# Patient Record
Sex: FEMALE | Race: ASIAN | NOT HISPANIC OR LATINO | URBAN - METROPOLITAN AREA
[De-identification: names, ages, dates, MRNs, and addresses within clinical notes are randomized per-mention and may not be internally consistent; named-entity substitution may affect disease eponyms.]

---

## 2017-02-14 VITALS
OXYGEN SATURATION: 98 % | RESPIRATION RATE: 16 BRPM | WEIGHT: 160.94 LBS | TEMPERATURE: 98 F | SYSTOLIC BLOOD PRESSURE: 133 MMHG | DIASTOLIC BLOOD PRESSURE: 65 MMHG

## 2017-02-14 RX ORDER — DIPHENHYDRAMINE HCL 50 MG
50 CAPSULE ORAL ONCE
Qty: 0 | Refills: 0 | Status: DISCONTINUED | OUTPATIENT
Start: 2017-02-16 | End: 2017-02-16

## 2017-02-14 RX ORDER — CHLORHEXIDINE GLUCONATE 213 G/1000ML
1 SOLUTION TOPICAL ONCE
Qty: 0 | Refills: 0 | Status: DISCONTINUED | OUTPATIENT
Start: 2017-02-16 | End: 2017-02-16

## 2017-02-14 NOTE — H&P ADULT. - HISTORY OF PRESENT ILLNESS
SKELETON    67 yo female PMHx HTN, hyperlipidemia presents to cardiologist endorsing chest pain and SOB. NST on 1/28/17 showed reversible perfusion defect in apical and lateral walls. Resting EKG showed nonspecific T wave changes. EF 69%.     In light of pt's risk factors, CCS Class .... anginal symptoms, and abnormal NST pt presents for recommended left cardiac catheterization with possible intervention. Severe shellfish allergy - premedicate with Prednisone 60mg Q8H on Wednesday and Thursday AM (4 doses total). Eprescribed to [facility] in [facility]. Pt works up there and will  on 2/15/17 AM. Dr. Burden aware.     Pt's phone # in [place] [phone]. Pt [place] commute to the city on Thurs to have procedure.    Pt will bring in all medications to hospital    65 yo female SHELLFISH ALLERGY (ANAPHYLAXIS), severe vomiting with Aspirin products, strong FHx MI (Brothers x 2 MI in 40s, Father MI), PMHx HTN, and "stomach ulcer" (pt has stomach pain with spicy food but has never had and EGD or been treated for an ulcer) presents to cardiologist endorsing chest pain and shortness of breath and fatigue when climbing 2 flights of stairs, worsening over the last 2 years. Denies dizziness, palpitations, PND/orthopnea, LE edema, claudication. NST on 1/28/17 showed reversible perfusion defect in apical and lateral walls. Resting EKG showed nonspecific T wave changes. EF 69%.     In light of pt's risk factors, CCS Class 3 anginal symptoms, and abnormal NST pt presents for recommended left cardiac catheterization with possible intervention. 65 yo female SHELLFISH ALLERGY (ANAPHYLAXIS), severe vomiting with Aspirin products, strong FHx MI (Brothers x 2 MI in 40s, Father MI), PMHx HTN, and "stomach ulcer" (pt has stomach pain with spicy food but has never had and EGD or been treated for an ulcer) presents to cardiologist endorsing chest pain and shortness of breath and fatigue when climbing 2 flights of stairs, worsening over the last 2 years. Denies dizziness, palpitations, PND/orthopnea, LE edema, claudication. Nuclear Stress Test on 1/28/17 showed reversible perfusion defect in apical and lateral walls. Resting EKG showed nonspecific T wave changes. EF 69%.     In light of pt's risk factors, CCS Class 3 anginal symptoms, and abnormal NST pt presents for recommended left cardiac catheterization with possible intervention.       **Of note: Severe shellfish allergy - premedicated with Prednisone 60mg Q8H on Wednesday and Thursday AM (4 doses total). Dr. Bertram george.

## 2017-02-14 NOTE — H&P ADULT. - FAMILY HISTORY
Sibling  Still living? No  Family history of myocardial infarction at age less than 60, Age at diagnosis: Age Unknown     Father  Still living? Unknown  Family history of acute myocardial infarction, Age at diagnosis: Age Unknown

## 2017-02-14 NOTE — H&P ADULT. - ASSESSMENT
66 yr old F with strong FHx of heart disease, PMHx of HTN with CCS Angina Class III equivalent symptoms and abnormal NST, patient now presents for recommended cardiac catheterization with possible intervention.     ASA II            Mallampati: II    Risks & benefits of procedure and alternative therapy have been explained to the patient including but not limited to: allergic reaction, bleeding w/possible need for blood transfusion, infection, renal and vascular compromise, limb damage, arrhythmia, stroke, vessel dissection/perforation, Myocardial infarction, emergent CABG. Informed consent obtained and in chart.

## 2017-02-14 NOTE — H&P ADULT. - PROBLEM SELECTOR PLAN 1
NPO, precath/consented, premedicated for shellfish allergy with Prednisone PO and will give Benadryl 50mg IV x 1 dose on call to cath lab NPO, precath/consented, premedicated for shellfish allergy with Prednisone PO and will give Benadryl 50mg IV x 1 dose on call to cath lab. Will hold off ASA load and give Plavix 600mg PO x 1 dose as discussed with Dr. Burden

## 2017-02-16 ENCOUNTER — OUTPATIENT (OUTPATIENT)
Dept: OUTPATIENT SERVICES | Facility: HOSPITAL | Age: 67
LOS: 1 days | Discharge: NURSING, REHAB OR LONG-TERM CARE | End: 2017-02-16
Payer: MEDICARE

## 2017-02-16 DIAGNOSIS — I20.9 ANGINA PECTORIS, UNSPECIFIED: ICD-10-CM

## 2017-02-16 DIAGNOSIS — E78.5 HYPERLIPIDEMIA, UNSPECIFIED: ICD-10-CM

## 2017-02-16 DIAGNOSIS — Z96.653 PRESENCE OF ARTIFICIAL KNEE JOINT, BILATERAL: Chronic | ICD-10-CM

## 2017-02-16 DIAGNOSIS — I10 ESSENTIAL (PRIMARY) HYPERTENSION: ICD-10-CM

## 2017-02-16 DIAGNOSIS — I25.110 ATHEROSCLEROTIC HEART DISEASE OF NATIVE CORONARY ARTERY WITH UNSTABLE ANGINA PECTORIS: ICD-10-CM

## 2017-02-16 DIAGNOSIS — Z98.49 CATARACT EXTRACTION STATUS, UNSPECIFIED EYE: Chronic | ICD-10-CM

## 2017-02-16 DIAGNOSIS — R94.39 ABNORMAL RESULT OF OTHER CARDIOVASCULAR FUNCTION STUDY: ICD-10-CM

## 2017-02-16 LAB
ALBUMIN SERPL ELPH-MCNC: 4 G/DL — SIGNIFICANT CHANGE UP (ref 3.4–5)
ALP SERPL-CCNC: 68 U/L — SIGNIFICANT CHANGE UP (ref 40–120)
ALT FLD-CCNC: 24 U/L — SIGNIFICANT CHANGE UP (ref 12–42)
ANION GAP SERPL CALC-SCNC: 10 MMOL/L — SIGNIFICANT CHANGE UP (ref 9–16)
APTT BLD: 37.9 SEC — HIGH (ref 27.5–37.4)
AST SERPL-CCNC: 14 U/L — LOW (ref 15–37)
BASOPHILS NFR BLD AUTO: 0 % — SIGNIFICANT CHANGE UP (ref 0–2)
BILIRUB SERPL-MCNC: 0.9 MG/DL — SIGNIFICANT CHANGE UP (ref 0.2–1.2)
BUN SERPL-MCNC: 19 MG/DL — SIGNIFICANT CHANGE UP (ref 7–23)
CALCIUM SERPL-MCNC: 9.1 MG/DL — SIGNIFICANT CHANGE UP (ref 8.5–10.5)
CHLORIDE SERPL-SCNC: 107 MMOL/L — SIGNIFICANT CHANGE UP (ref 96–108)
CHOLEST SERPL-MCNC: 217 MG/DL — HIGH
CK MB CFR SERPL CALC: 2.3 NG/ML — SIGNIFICANT CHANGE UP (ref 0.5–3.6)
CO2 SERPL-SCNC: 24 MMOL/L — SIGNIFICANT CHANGE UP (ref 22–31)
CREAT SERPL-MCNC: 0.62 MG/DL — SIGNIFICANT CHANGE UP (ref 0.5–1.3)
EOSINOPHIL NFR BLD AUTO: 0 % — SIGNIFICANT CHANGE UP (ref 0–6)
GLUCOSE SERPL-MCNC: 135 MG/DL — HIGH (ref 70–99)
HCT VFR BLD CALC: 36.8 % — SIGNIFICANT CHANGE UP (ref 34.5–45)
HDLC SERPL-MCNC: 85 MG/DL — SIGNIFICANT CHANGE UP
HGB BLD-MCNC: 12.1 G/DL — SIGNIFICANT CHANGE UP (ref 11.5–15.5)
INR BLD: 1 — SIGNIFICANT CHANGE UP (ref 0.88–1.16)
LIPID PNL WITH DIRECT LDL SERPL: 121 MG/DL — HIGH
LYMPHOCYTES # BLD AUTO: 7.8 % — LOW (ref 13–44)
MCHC RBC-ENTMCNC: 21.5 PG — LOW (ref 27–34)
MCHC RBC-ENTMCNC: 32.9 G/DL — SIGNIFICANT CHANGE UP (ref 32–36)
MCV RBC AUTO: 65.2 FL — LOW (ref 80–100)
MONOCYTES NFR BLD AUTO: 1.4 % — LOW (ref 2–14)
NEUTROPHILS NFR BLD AUTO: 90.8 % — HIGH (ref 43–77)
PLATELET # BLD AUTO: 194 K/UL — SIGNIFICANT CHANGE UP (ref 150–400)
POTASSIUM SERPL-MCNC: 4.1 MMOL/L — SIGNIFICANT CHANGE UP (ref 3.5–5.3)
POTASSIUM SERPL-SCNC: 4.1 MMOL/L — SIGNIFICANT CHANGE UP (ref 3.5–5.3)
PROT SERPL-MCNC: 7.9 G/DL — SIGNIFICANT CHANGE UP (ref 6.4–8.2)
PROTHROM AB SERPL-ACNC: 11.1 SEC — SIGNIFICANT CHANGE UP (ref 10–13.1)
RBC # BLD: 5.64 M/UL — HIGH (ref 3.8–5.2)
RBC # FLD: 16.9 % — SIGNIFICANT CHANGE UP (ref 10.3–16.9)
SODIUM SERPL-SCNC: 141 MMOL/L — SIGNIFICANT CHANGE UP (ref 135–145)
TOTAL CHOLESTEROL/HDL RATIO MEASUREMENT: 2.6 RATIO — SIGNIFICANT CHANGE UP
TRIGL SERPL-MCNC: 56 MG/DL — SIGNIFICANT CHANGE UP
WBC # BLD: 11.6 K/UL — HIGH (ref 3.8–10.5)
WBC # FLD AUTO: 11.6 K/UL — HIGH (ref 3.8–10.5)

## 2017-02-16 PROCEDURE — 85610 PROTHROMBIN TIME: CPT

## 2017-02-16 PROCEDURE — C1769: CPT

## 2017-02-16 PROCEDURE — C1887: CPT

## 2017-02-16 PROCEDURE — 93010 ELECTROCARDIOGRAM REPORT: CPT

## 2017-02-16 PROCEDURE — 93454 CORONARY ARTERY ANGIO S&I: CPT

## 2017-02-16 PROCEDURE — 82553 CREATINE MB FRACTION: CPT

## 2017-02-16 PROCEDURE — 85025 COMPLETE CBC W/AUTO DIFF WBC: CPT

## 2017-02-16 PROCEDURE — 36415 COLL VENOUS BLD VENIPUNCTURE: CPT

## 2017-02-16 PROCEDURE — 80053 COMPREHEN METABOLIC PANEL: CPT

## 2017-02-16 PROCEDURE — 93005 ELECTROCARDIOGRAM TRACING: CPT

## 2017-02-16 PROCEDURE — 85730 THROMBOPLASTIN TIME PARTIAL: CPT

## 2017-02-16 PROCEDURE — 82550 ASSAY OF CK (CPK): CPT

## 2017-02-16 PROCEDURE — 80061 LIPID PANEL: CPT

## 2017-02-16 PROCEDURE — 93454 CORONARY ARTERY ANGIO S&I: CPT | Mod: 26

## 2017-02-16 RX ORDER — LOSARTAN POTASSIUM 100 MG/1
1 TABLET, FILM COATED ORAL
Qty: 0 | Refills: 0 | COMMUNITY

## 2017-02-16 RX ORDER — SODIUM CHLORIDE 9 MG/ML
500 INJECTION INTRAMUSCULAR; INTRAVENOUS; SUBCUTANEOUS
Qty: 0 | Refills: 0 | Status: DISCONTINUED | OUTPATIENT
Start: 2017-02-16 | End: 2017-02-16

## 2017-02-16 RX ORDER — ATENOLOL 25 MG/1
1 TABLET ORAL
Qty: 0 | Refills: 0 | COMMUNITY

## 2017-02-16 RX ORDER — HYDROXYZINE HCL 10 MG
1 TABLET ORAL
Qty: 0 | Refills: 0 | COMMUNITY

## 2017-02-16 RX ORDER — TRAMADOL HYDROCHLORIDE 50 MG/1
1 TABLET ORAL
Qty: 0 | Refills: 0 | COMMUNITY

## 2017-02-16 RX ORDER — CLOPIDOGREL BISULFATE 75 MG/1
600 TABLET, FILM COATED ORAL ONCE
Qty: 0 | Refills: 0 | Status: COMPLETED | OUTPATIENT
Start: 2017-02-16 | End: 2017-02-16

## 2017-02-16 RX ORDER — CYCLOSPORINE 0.5 MG/ML
1 EMULSION OPHTHALMIC
Qty: 0 | Refills: 0 | COMMUNITY

## 2017-02-16 RX ADMIN — SODIUM CHLORIDE 75 MILLILITER(S): 9 INJECTION INTRAMUSCULAR; INTRAVENOUS; SUBCUTANEOUS at 14:35

## 2017-02-16 RX ADMIN — CLOPIDOGREL BISULFATE 600 MILLIGRAM(S): 75 TABLET, FILM COATED ORAL at 14:45

## 2017-02-16 NOTE — PROGRESS NOTE ADULT - SUBJECTIVE AND OBJECTIVE BOX
Interventional Cardiology PA SDA Discharge Note    Patient without complaints. Ambulated and voided without difficulties    Afebrile, VSS    Ext:    		Right  Radial : no hematoma,  no bleeding, dressing; C/D/I      Pulses:    intact RAD to baseline     A/P:      65 yo female SHELLFISH ALLERGY (ANAPHYLAXIS), severe vomiting with Aspirin products, strong FHx MI (Brothers x 2 MI in 40s, Father MI), PMHx HTN, and "stomach ulcer" (pt has stomach pain with spicy food but has never had and EGD or been treated for an ulcer) presents to cardiologist endorsing chest pain and shortness of breath and fatigue when climbing 2 flights of stairs, worsening over the last 2 years. Denies dizziness, palpitations, PND/orthopnea, LE edema, claudication. Nuclear Stress Test on 1/28/17 showed reversible perfusion defect in apical and lateral walls. Resting EKG showed nonspecific T wave changes. EF 69%. In light of pt's risk factors, CCS Class 3 anginal symptoms, and abnormal NST pt presents for recommended left cardiac catheterization with possible intervention.     Pt is now s/p cardiac cath 02/16/17 revealing non-obstructive CAD LMCA normal, prox LAD 20%, LCx/OMs/RCA luminal irregularities. LV gram not performed. Recommended for weight loss and medical management.           1.	Stable for discharge as per attending Dr. Burden after bed rest, pt voids, groin/wrist stable and 30 minutes of ambulation.  2.	Follow-up with PMD/Cardiologist Dr. Vilchis in 1-2 weeks  3.	Discharged forms signed and copies in chart

## 2020-09-03 ENCOUNTER — INPATIENT (INPATIENT)
Facility: HOSPITAL | Age: 70
LOS: 2 days | Discharge: HOME | DRG: 312 | End: 2020-09-06
Attending: INTERNAL MEDICINE | Admitting: INTERNAL MEDICINE
Payer: COMMERCIAL

## 2020-09-03 VITALS
OXYGEN SATURATION: 100 % | HEIGHT: 60 IN | TEMPERATURE: 98 F | DIASTOLIC BLOOD PRESSURE: 80 MMHG | HEART RATE: 83 BPM | WEIGHT: 149.69 LBS | RESPIRATION RATE: 20 BRPM | SYSTOLIC BLOOD PRESSURE: 137 MMHG

## 2020-09-03 DIAGNOSIS — R42 DIZZINESS AND GIDDINESS: ICD-10-CM

## 2020-09-03 DIAGNOSIS — I48.91 UNSPECIFIED ATRIAL FIBRILLATION: ICD-10-CM

## 2020-09-03 DIAGNOSIS — Z29.9 ENCOUNTER FOR PROPHYLACTIC MEASURES, UNSPECIFIED: ICD-10-CM

## 2020-09-03 DIAGNOSIS — Z98.49 CATARACT EXTRACTION STATUS, UNSPECIFIED EYE: Chronic | ICD-10-CM

## 2020-09-03 DIAGNOSIS — Z96.651 PRESENCE OF RIGHT ARTIFICIAL KNEE JOINT: Chronic | ICD-10-CM

## 2020-09-03 DIAGNOSIS — U07.1 COVID-19: ICD-10-CM

## 2020-09-03 DIAGNOSIS — Z96.653 PRESENCE OF ARTIFICIAL KNEE JOINT, BILATERAL: Chronic | ICD-10-CM

## 2020-09-03 DIAGNOSIS — E78.5 HYPERLIPIDEMIA, UNSPECIFIED: ICD-10-CM

## 2020-09-03 DIAGNOSIS — I10 ESSENTIAL (PRIMARY) HYPERTENSION: ICD-10-CM

## 2020-09-03 DIAGNOSIS — Z96.652 PRESENCE OF LEFT ARTIFICIAL KNEE JOINT: Chronic | ICD-10-CM

## 2020-09-03 LAB
ALBUMIN SERPL ELPH-MCNC: 3.5 G/DL — SIGNIFICANT CHANGE UP (ref 3.5–5)
ALP SERPL-CCNC: 69 U/L — SIGNIFICANT CHANGE UP (ref 40–120)
ALT FLD-CCNC: 17 U/L DA — SIGNIFICANT CHANGE UP (ref 10–60)
ANION GAP SERPL CALC-SCNC: 3 MMOL/L — LOW (ref 5–17)
APTT BLD: 42.1 SEC — HIGH (ref 27.5–35.5)
AST SERPL-CCNC: 13 U/L — SIGNIFICANT CHANGE UP (ref 10–40)
BILIRUB SERPL-MCNC: 0.3 MG/DL — SIGNIFICANT CHANGE UP (ref 0.2–1.2)
BUN SERPL-MCNC: 17 MG/DL — SIGNIFICANT CHANGE UP (ref 7–18)
CALCIUM SERPL-MCNC: 8.1 MG/DL — LOW (ref 8.4–10.5)
CHLORIDE SERPL-SCNC: 109 MMOL/L — HIGH (ref 96–108)
CK MB BLD-MCNC: <3 % — SIGNIFICANT CHANGE UP (ref 0–3.5)
CK MB CFR SERPL CALC: <1 NG/ML — SIGNIFICANT CHANGE UP (ref 0–3.6)
CK SERPL-CCNC: 33 U/L — SIGNIFICANT CHANGE UP (ref 21–215)
CO2 SERPL-SCNC: 28 MMOL/L — SIGNIFICANT CHANGE UP (ref 22–31)
CREAT SERPL-MCNC: 0.8 MG/DL — SIGNIFICANT CHANGE UP (ref 0.5–1.3)
GLUCOSE SERPL-MCNC: 101 MG/DL — HIGH (ref 70–99)
HCT VFR BLD CALC: 39.3 % — SIGNIFICANT CHANGE UP (ref 34.5–45)
HGB BLD-MCNC: 11.7 G/DL — SIGNIFICANT CHANGE UP (ref 11.5–15.5)
INR BLD: 1.14 RATIO — SIGNIFICANT CHANGE UP (ref 0.88–1.16)
MCHC RBC-ENTMCNC: 19.7 PG — LOW (ref 27–34)
MCHC RBC-ENTMCNC: 29.8 GM/DL — LOW (ref 32–36)
MCV RBC AUTO: 66.3 FL — LOW (ref 80–100)
NRBC # BLD: 0 /100 WBCS — SIGNIFICANT CHANGE UP (ref 0–0)
NT-PROBNP SERPL-SCNC: 45 PG/ML — SIGNIFICANT CHANGE UP (ref 0–125)
PLATELET # BLD AUTO: 238 K/UL — SIGNIFICANT CHANGE UP (ref 150–400)
POTASSIUM SERPL-MCNC: 3.9 MMOL/L — SIGNIFICANT CHANGE UP (ref 3.5–5.3)
POTASSIUM SERPL-SCNC: 3.9 MMOL/L — SIGNIFICANT CHANGE UP (ref 3.5–5.3)
PROT SERPL-MCNC: 7.7 G/DL — SIGNIFICANT CHANGE UP (ref 6–8.3)
PROTHROM AB SERPL-ACNC: 13.2 SEC — SIGNIFICANT CHANGE UP (ref 10.6–13.6)
RBC # BLD: 5.93 M/UL — HIGH (ref 3.8–5.2)
RBC # FLD: 19.1 % — HIGH (ref 10.3–14.5)
SODIUM SERPL-SCNC: 140 MMOL/L — SIGNIFICANT CHANGE UP (ref 135–145)
TROPONIN I SERPL-MCNC: <0.015 NG/ML — SIGNIFICANT CHANGE UP (ref 0–0.04)
TROPONIN I SERPL-MCNC: <0.015 NG/ML — SIGNIFICANT CHANGE UP (ref 0–0.04)
WBC # BLD: 6.37 K/UL — SIGNIFICANT CHANGE UP (ref 3.8–10.5)
WBC # FLD AUTO: 6.37 K/UL — SIGNIFICANT CHANGE UP (ref 3.8–10.5)

## 2020-09-03 PROCEDURE — 71045 X-RAY EXAM CHEST 1 VIEW: CPT | Mod: 26

## 2020-09-03 PROCEDURE — 93880 EXTRACRANIAL BILAT STUDY: CPT | Mod: 26

## 2020-09-03 PROCEDURE — 99223 1ST HOSP IP/OBS HIGH 75: CPT | Mod: AI,GC

## 2020-09-03 PROCEDURE — 99285 EMERGENCY DEPT VISIT HI MDM: CPT | Mod: 25

## 2020-09-03 PROCEDURE — 70450 CT HEAD/BRAIN W/O DYE: CPT | Mod: 26

## 2020-09-03 RX ORDER — SODIUM CHLORIDE 9 MG/ML
1000 INJECTION INTRAMUSCULAR; INTRAVENOUS; SUBCUTANEOUS ONCE
Refills: 0 | Status: COMPLETED | OUTPATIENT
Start: 2020-09-03 | End: 2020-09-03

## 2020-09-03 RX ORDER — DILTIAZEM HCL 120 MG
120 CAPSULE, EXT RELEASE 24 HR ORAL DAILY
Refills: 0 | Status: DISCONTINUED | OUTPATIENT
Start: 2020-09-03 | End: 2020-09-03

## 2020-09-03 RX ORDER — ONDANSETRON 8 MG/1
4 TABLET, FILM COATED ORAL ONCE
Refills: 0 | Status: COMPLETED | OUTPATIENT
Start: 2020-09-03 | End: 2020-09-03

## 2020-09-03 RX ORDER — ATENOLOL 25 MG/1
25 TABLET ORAL DAILY
Refills: 0 | Status: DISCONTINUED | OUTPATIENT
Start: 2020-09-03 | End: 2020-09-03

## 2020-09-03 RX ORDER — SODIUM CHLORIDE 9 MG/ML
1000 INJECTION INTRAMUSCULAR; INTRAVENOUS; SUBCUTANEOUS
Refills: 0 | Status: DISCONTINUED | OUTPATIENT
Start: 2020-09-03 | End: 2020-09-06

## 2020-09-03 RX ORDER — MECLIZINE HCL 12.5 MG
25 TABLET ORAL ONCE
Refills: 0 | Status: COMPLETED | OUTPATIENT
Start: 2020-09-03 | End: 2020-09-03

## 2020-09-03 RX ORDER — ACETAMINOPHEN 500 MG
975 TABLET ORAL ONCE
Refills: 0 | Status: COMPLETED | OUTPATIENT
Start: 2020-09-03 | End: 2020-09-03

## 2020-09-03 RX ORDER — APIXABAN 2.5 MG/1
5 TABLET, FILM COATED ORAL EVERY 12 HOURS
Refills: 0 | Status: DISCONTINUED | OUTPATIENT
Start: 2020-09-03 | End: 2020-09-06

## 2020-09-03 RX ORDER — DILTIAZEM HCL 120 MG
30 CAPSULE, EXT RELEASE 24 HR ORAL EVERY 6 HOURS
Refills: 0 | Status: DISCONTINUED | OUTPATIENT
Start: 2020-09-03 | End: 2020-09-06

## 2020-09-03 RX ADMIN — Medication 25 MILLIGRAM(S): at 10:43

## 2020-09-03 RX ADMIN — SODIUM CHLORIDE 1000 MILLILITER(S): 9 INJECTION INTRAMUSCULAR; INTRAVENOUS; SUBCUTANEOUS at 10:42

## 2020-09-03 RX ADMIN — Medication 975 MILLIGRAM(S): at 15:28

## 2020-09-03 RX ADMIN — ONDANSETRON 4 MILLIGRAM(S): 8 TABLET, FILM COATED ORAL at 10:44

## 2020-09-03 NOTE — ED PROVIDER NOTE - OBJECTIVE STATEMENT
69 yo F pmh of covid with prolonged hospital stay (s/p intubation, s/p PEG), on home oxygen as needed, c/o sudden onset dizziness that started after walking back to bed from bathroom. Feels like 'falling.' +nausea and vomiting. Denies other acute complaints. 69 yo F pmh of covid with prolonged hospital stay (s/p intubation, s/p PEG), on eliquis, on home oxygen as needed, c/o sudden onset dizziness that started after walking back to bed from bathroom. Feels like 'falling.' +nausea and vomiting. Denies other acute complaints.

## 2020-09-03 NOTE — H&P ADULT - PROBLEM SELECTOR PLAN 1
Patient came in with dizziness.  CT head was done showed   1.  No acute intracranial hemorrhage.  2.  1 x 0.2 x 0.7 cm right posterior parafalcine lesion, possibly a meningioma. Patient came in with dizziness.  CT head was done showed   1.  No acute intracranial hemorrhage.  2.  1 x 0.2 x 0.7 cm right posterior parafalcine lesion, possibly a meningioma.  Patient orthostatics are positive , will continue with IV hydration .  Carotid doppler noted no acute pathology.  Follow ECHO cardiogram.  neuro recommendations Patient came in with dizziness.  CT head was done showed   1.  No acute intracranial hemorrhage.  2.  1 x 0.2 x 0.7 cm right posterior parafalcine lesion, possibly a meningioma.  Patient orthostatics are positive , will continue with IV hydration .  EKG showed NSR .  Will monitor the patient on telemetry  Carotid doppler noted no acute pathology.  Follow ECHO cardiogram.  neuro recommendations

## 2020-09-03 NOTE — ED PROVIDER NOTE - PROGRESS NOTE DETAILS
EKG - nsr, rate 98, QTc 485  labs - no acute findings  CT head - no ICH, possible meningioma  Feeling better after meds. Tolerating PO. Repeat neuro exam wnl. Symptoms likely peripheral vertigo given worse with laying/moving after using bathroom. Will fu with PCP. Discussed indications for patient return to ED. Patient understood. EKG - nsr, rate 98, QTc 485  labs - no acute findings  CT head - no ICH, possible meningioma  Pt still feeling dizzy. Given recent covid and hypercoagulable state, there is some concern for thromboembolic event and posterior CVA. PCP admits to hospitalist; endorsed to Dr Hickman and MAR. NIH 0. Passes dysphagia. Allergic to aspirin.

## 2020-09-03 NOTE — H&P ADULT - ATTENDING COMMENTS
Patient was interviewed and examined in the ED and discussed with Dr. James.     She came because of dizziness and gait instability today.   PMH, as above, significantly had prolonged hospitalization and rehab this year because of COVID pneumonia.      Alert, cooperative woman, wearing nasal oxygen  Vital Signs Last 24 Hrs  T(C): 36.6 (03 Sep 2020 19:23), Max: 37.1 (03 Sep 2020 10:53)  T(F): 97.8 (03 Sep 2020 19:23), Max: 98.7 (03 Sep 2020 10:53)  HR: 68 (03 Sep 2020 19:23) (68 - 83)  BP: 123/77 (03 Sep 2020 19:23) (110/66 - 137/80)  BP(mean): --  RR: 20 (03 Sep 2020 19:23) (20 - 20)  SpO2: 100% (03 Sep 2020 19:23) (99% - 100%)  Lungs, diffuse bilateral creps  Cor, RRR  Neurological, non-focal    < from: CT Head No Cont (09.03.20 @ 09:38) >    IMPRESSION:  1.  No acute intracranial hemorrhage.  2.  1 x 0.2 x 0.7 cm right posterior parafalcine lesion, possibly a meningioma.    < end of copied text >    < from: Xray Chest 1 View- PORTABLE-Urgent (09.03.20 @ 10:19) >      There is a diffuse roughly symmetric interstitial infiltrative pattern throughout all lung fields. This likely represents a residual of the Covid infection. Acute component of disease is difficult to entirely exclude.    IMPRESSION: As above.    < end of copied text >      IMP: Dizziness, COVID neuropathy vs BPPV, most likely etiologies.  Orthostatic changes are positive, c/w dehydration         Chronic pulmonary disease post-COVID.  Acute PE, unlikely, in view of non-worsening pulmonary status.  Plan: Nasal oxygen.  IV fluids, repeat orthostatics and O2 sats          Continue DOAC          inflammatory markers          Pulmonary Consultation, Dr. Noel

## 2020-09-03 NOTE — H&P ADULT - NSICDXPASTSURGICALHX_GEN_ALL_CORE_FT
PAST SURGICAL HISTORY:  Knee joint replacement status, right     Status post revision of total replacement of left knee

## 2020-09-03 NOTE — ED ADULT NURSE NOTE - DRUG PRE-SCREENING (DAST -1)
Self Skin Exam and Sunscreens    Early detection and treatment is essential in the treatment of all forms of skin cancer. If caught early, all forms of skin cancer are curable. In addition to your regular visits, you should perform a monthly skin examination. Over time, you become familiar with what is normally found on your skin and can identify new or suspicious spots. One of the screening tools you can use to assess your skin is to follow the ABCDEs:    A= Asymmetry (One half is unlike the other half)     B= Border (An irregular, scalloped or poorly defined edge)    C= Color (Is varied from one area to another, has shades of tan, brown/ black,       white, red or blue)    D= Diameter (Spots larger than 6mm or a pencil eraser)    E= Evolving (New spots or one that is changing in size, shape, or color)    A follow- up interval will be customized based on your history of skin cancer or level of skin damage and risk factors. In any case, if you notice a suspicious or new spot, an appointment should be arranged between regular visits. Everyone should use sunscreen and sun-safe practices, which is especially important for those with a personal or family history of skin cancer. Suggestions for this include:    1. Use daily moisturizers containing SPF 30 or higher. 2. Wear long sleeve clothing with UPF ratings and a broad-brimmed hat. 3. Apply sunscreen with SPF 30 or higher to all sun exposed areas if you are going to be in the sun. A broad spectrum UVA/ UVB sunscreen is best.  Dont forget to REAPPLY every two hours or more often if swimming or sweating! 4. Avoid outside activities during peak sun hours, especially in the summer (10am- 2pm). 5. DO NOT use tanning beds. Using sunscreen and sun-safe practices can help reduce the likelihood of developing skin cancer or additional skin cancers in those previously diagnosed.
Statement Selected

## 2020-09-03 NOTE — H&P ADULT - PROBLEM SELECTOR PLAN 5
RISK                                                          Points  [] Previous VTE                                           3  [] Thrombophilia                                        2  [] Lower limb paralysis                              2   [] Current Cancer                                       2   [x] Immobilization > 24 hrs                        1  [] ICU/CCU stay > 24 hours                       1  [x] Age > 60                                                   1    Will continue with Eliquis

## 2020-09-03 NOTE — H&P ADULT - PROBLEM SELECTOR PLAN 3
Patient is on Cardizem will continue .   monitor blood pressure Patient is on Cardizem will continue .   monitor blood pressure.  Holding losartan because blood pressure is stable can be restarted if needed

## 2020-09-03 NOTE — H&P ADULT - NSHPPHYSICALEXAM_GEN_ALL_CORE
Vital Signs Last 24 Hrs  T(C): 36.7 (03 Sep 2020 15:29), Max: 37.1 (03 Sep 2020 10:53)  T(F): 98.1 (03 Sep 2020 15:29), Max: 98.7 (03 Sep 2020 10:53)  HR: 72 (03 Sep 2020 15:29) (72 - 83)  BP: 110/66 (03 Sep 2020 15:29) (110/66 - 137/80)  BP(mean): --  RR: 20 (03 Sep 2020 15:29) (20 - 20)  SpO2: 100% (03 Sep 2020 15:29) (99% - 100%)\    GENERAL: elderly female in NAD  HEAD:  Atraumatic, Normocephalic  EYES: EOMI, PERRLA, conjunctiva and sclera clear  NECK: Supple, No JVD  CHEST/LUNG: Clear to auscultation bilaterally; No wheeze; No crackles; No accessory muscles used  HEART: Regular rate and rhythm; No murmurs;   ABDOMEN: Soft, Nontender, Nondistended; Bowel sounds present; No guarding  EXTREMITIES:  2+ Peripheral Pulses, No cyanosis or edema  PSYCH:  Normal Affect  NEUROLOGY: non-focal, AAO X 3. Strength is 5/5. no sensory loss.  SKIN: No rashes or lesions

## 2020-09-03 NOTE — H&P ADULT - HISTORY OF PRESENT ILLNESS
70 year old female with medical history of HTN, Afib, HLD, COVID s/p intubation and PEG on home O2 (2L)  presented to ED for complaining of Dizziness. Patient states that she  was feeling very dizzy ( room spinning) since this morning associated with 3 episodes of vomiting , Vomitus contained water because off nausea she is did not ate any thing since this morning. She feels like she will fall and will loose balance. Patient states that she was diagnosed iwth Covid in march was admitted to Hospital was intubated had prolonged hospital course was intubated , Peg tube was placed later Patient was extubated and was discharged to Rehab. Patient was discharged from Rehab in July 2020. PEG tube was removed . Patient is able to tolerate PO diet for 3 weeks but states she still feels weak. Patient denies any chest pain, palpitations 70 year old female with medical history of HTN, Afib, HLD, COVID s/p intubation and PEG on home O2 (2L)  presented to ED for complaining of Dizziness. Patient states that she  was feeling very dizzy ( room spinning) since this morning associated with 3 episodes of vomiting , Vomitus contained water because off nausea she is did not ate any thing since this morning. She feels like she will fall and will loose balance. Patient states that she was diagnosed iwth Covid in march was admitted to Hospital was intubated had prolonged hospital course was intubated , Peg tube was placed later Patient was extubated and was discharged to Rehab. Patient was discharged from Rehab in July 2020. PEG tube was removed . Patient is able to tolerate PO diet for 3 weeks but states she still feels weak. Patient denies any chest pain, palpitations , head ache, fever, chills , or urinary problems

## 2020-09-03 NOTE — H&P ADULT - ASSESSMENT
70 year old female with medical history of HTN, Afib, HLD, COVID s/p intubation and PEG on home O2 (2L)  presented to ED for complaining of Dizziness. Patient states that she  was feeling very dizzy ( room spinning) since this morning associated with 3 episodes of vomiting , Vomitus contained water because off nausea she is did not ate any thing since this morning. She feels like she will fall and will loose balance. Patient states that she was diagnosed with Covid in march was admitted to Hospital was intubated had prolonged hospital course was intubated , Peg tube was placed later Patient was extubated and was discharged to Rehab. Patient was discharged from Rehab in July 2020. PEG tube was removed . Patient is able to tolerate PO diet for 3 weeks but states she still feels weak. Patient denies any chest pain, palpitations , head ache, fever, chills , or urinary problems.        Patient does not remember all her medications , she has two different lists, Primary team please confirm from [facility]  [phone]

## 2020-09-03 NOTE — H&P ADULT - PROBLEM SELECTOR PLAN 2
Patient has Hx of Afib on Eliquis and Cardizem ?  Will continue with home meds  Primary team please confirm her meds in am

## 2020-09-04 ENCOUNTER — TRANSCRIPTION ENCOUNTER (OUTPATIENT)
Age: 70
End: 2020-09-04

## 2020-09-04 DIAGNOSIS — Z02.9 ENCOUNTER FOR ADMINISTRATIVE EXAMINATIONS, UNSPECIFIED: ICD-10-CM

## 2020-09-04 DIAGNOSIS — D32.9 BENIGN NEOPLASM OF MENINGES, UNSPECIFIED: ICD-10-CM

## 2020-09-04 DIAGNOSIS — Z71.89 OTHER SPECIFIED COUNSELING: ICD-10-CM

## 2020-09-04 DIAGNOSIS — E83.51 HYPOCALCEMIA: ICD-10-CM

## 2020-09-04 PROBLEM — K25.9 GASTRIC ULCER, UNSPECIFIED AS ACUTE OR CHRONIC, WITHOUT HEMORRHAGE OR PERFORATION: Chronic | Status: ACTIVE | Noted: 2017-02-14

## 2020-09-04 PROBLEM — I10 ESSENTIAL (PRIMARY) HYPERTENSION: Chronic | Status: ACTIVE | Noted: 2017-02-14

## 2020-09-04 LAB
A1C WITH ESTIMATED AVERAGE GLUCOSE RESULT: 5.8 % — HIGH (ref 4–5.6)
ALBUMIN SERPL ELPH-MCNC: 2.9 G/DL — LOW (ref 3.5–5)
ALP SERPL-CCNC: 56 U/L — SIGNIFICANT CHANGE UP (ref 40–120)
ALT FLD-CCNC: 14 U/L DA — SIGNIFICANT CHANGE UP (ref 10–60)
ANION GAP SERPL CALC-SCNC: 3 MMOL/L — LOW (ref 5–17)
AST SERPL-CCNC: 13 U/L — SIGNIFICANT CHANGE UP (ref 10–40)
BASOPHILS # BLD AUTO: 0.03 K/UL — SIGNIFICANT CHANGE UP (ref 0–0.2)
BASOPHILS NFR BLD AUTO: 0.6 % — SIGNIFICANT CHANGE UP (ref 0–2)
BILIRUB SERPL-MCNC: 0.5 MG/DL — SIGNIFICANT CHANGE UP (ref 0.2–1.2)
BUN SERPL-MCNC: 12 MG/DL — SIGNIFICANT CHANGE UP (ref 7–18)
CALCIUM SERPL-MCNC: 7.5 MG/DL — LOW (ref 8.4–10.5)
CHLORIDE SERPL-SCNC: 112 MMOL/L — HIGH (ref 96–108)
CHOLEST SERPL-MCNC: 199 MG/DL — SIGNIFICANT CHANGE UP (ref 10–199)
CO2 SERPL-SCNC: 27 MMOL/L — SIGNIFICANT CHANGE UP (ref 22–31)
CREAT SERPL-MCNC: 0.8 MG/DL — SIGNIFICANT CHANGE UP (ref 0.5–1.3)
D DIMER BLD IA.RAPID-MCNC: <150 NG/ML DDU — SIGNIFICANT CHANGE UP
EOSINOPHIL # BLD AUTO: 0.11 K/UL — SIGNIFICANT CHANGE UP (ref 0–0.5)
EOSINOPHIL NFR BLD AUTO: 2.1 % — SIGNIFICANT CHANGE UP (ref 0–6)
ESTIMATED AVERAGE GLUCOSE: 120 MG/DL — HIGH (ref 68–114)
FERRITIN SERPL-MCNC: 106 NG/ML — SIGNIFICANT CHANGE UP (ref 15–150)
FOLATE SERPL-MCNC: 10.8 NG/ML — SIGNIFICANT CHANGE UP
GLUCOSE SERPL-MCNC: 83 MG/DL — SIGNIFICANT CHANGE UP (ref 70–99)
HCT VFR BLD CALC: 34.5 % — SIGNIFICANT CHANGE UP (ref 34.5–45)
HCV AB S/CO SERPL IA: 0.21 S/CO — SIGNIFICANT CHANGE UP (ref 0–0.99)
HCV AB SERPL-IMP: SIGNIFICANT CHANGE UP
HDLC SERPL-MCNC: 48 MG/DL — LOW
HGB BLD-MCNC: 10.6 G/DL — LOW (ref 11.5–15.5)
IMM GRANULOCYTES NFR BLD AUTO: 0.2 % — SIGNIFICANT CHANGE UP (ref 0–1.5)
LDH SERPL L TO P-CCNC: 146 U/L — SIGNIFICANT CHANGE UP (ref 120–225)
LIPID PNL WITH DIRECT LDL SERPL: 126 MG/DL — SIGNIFICANT CHANGE UP
LYMPHOCYTES # BLD AUTO: 1.82 K/UL — SIGNIFICANT CHANGE UP (ref 1–3.3)
LYMPHOCYTES # BLD AUTO: 35.1 % — SIGNIFICANT CHANGE UP (ref 13–44)
MAGNESIUM SERPL-MCNC: 2.6 MG/DL — SIGNIFICANT CHANGE UP (ref 1.6–2.6)
MCHC RBC-ENTMCNC: 20.6 PG — LOW (ref 27–34)
MCHC RBC-ENTMCNC: 30.7 GM/DL — LOW (ref 32–36)
MCV RBC AUTO: 67 FL — LOW (ref 80–100)
MONOCYTES # BLD AUTO: 0.38 K/UL — SIGNIFICANT CHANGE UP (ref 0–0.9)
MONOCYTES NFR BLD AUTO: 7.3 % — SIGNIFICANT CHANGE UP (ref 2–14)
NEUTROPHILS # BLD AUTO: 2.84 K/UL — SIGNIFICANT CHANGE UP (ref 1.8–7.4)
NEUTROPHILS NFR BLD AUTO: 54.7 % — SIGNIFICANT CHANGE UP (ref 43–77)
NRBC # BLD: 0 /100 WBCS — SIGNIFICANT CHANGE UP (ref 0–0)
PHOSPHATE SERPL-MCNC: 2.5 MG/DL — SIGNIFICANT CHANGE UP (ref 2.5–4.5)
PLATELET # BLD AUTO: 219 K/UL — SIGNIFICANT CHANGE UP (ref 150–400)
POTASSIUM SERPL-MCNC: 4.2 MMOL/L — SIGNIFICANT CHANGE UP (ref 3.5–5.3)
POTASSIUM SERPL-SCNC: 4.2 MMOL/L — SIGNIFICANT CHANGE UP (ref 3.5–5.3)
PROT SERPL-MCNC: 6.6 G/DL — SIGNIFICANT CHANGE UP (ref 6–8.3)
RBC # BLD: 5.15 M/UL — SIGNIFICANT CHANGE UP (ref 3.8–5.2)
RBC # FLD: 19.4 % — HIGH (ref 10.3–14.5)
SARS-COV-2 IGG SERPL QL IA: POSITIVE
SARS-COV-2 IGM SERPL IA-ACNC: 82.2 INDEX — HIGH
SARS-COV-2 RNA SPEC QL NAA+PROBE: SIGNIFICANT CHANGE UP
SODIUM SERPL-SCNC: 142 MMOL/L — SIGNIFICANT CHANGE UP (ref 135–145)
TOTAL CHOLESTEROL/HDL RATIO MEASUREMENT: 4.1 RATIO — SIGNIFICANT CHANGE UP (ref 3.3–7.1)
TRIGL SERPL-MCNC: 124 MG/DL — SIGNIFICANT CHANGE UP (ref 10–149)
TROPONIN I SERPL-MCNC: <0.015 NG/ML — SIGNIFICANT CHANGE UP (ref 0–0.04)
TSH SERPL-MCNC: 1.27 UU/ML — SIGNIFICANT CHANGE UP (ref 0.34–4.82)
VIT B12 SERPL-MCNC: 1152 PG/ML — SIGNIFICANT CHANGE UP (ref 232–1245)
WBC # BLD: 5.19 K/UL — SIGNIFICANT CHANGE UP (ref 3.8–10.5)
WBC # FLD AUTO: 5.19 K/UL — SIGNIFICANT CHANGE UP (ref 3.8–10.5)

## 2020-09-04 PROCEDURE — 99232 SBSQ HOSP IP/OBS MODERATE 35: CPT | Mod: GC

## 2020-09-04 PROCEDURE — 99223 1ST HOSP IP/OBS HIGH 75: CPT

## 2020-09-04 RX ORDER — POLYETHYLENE GLYCOL 3350 17 G/17G
17 POWDER, FOR SOLUTION ORAL ONCE
Refills: 0 | Status: COMPLETED | OUTPATIENT
Start: 2020-09-04 | End: 2020-09-04

## 2020-09-04 RX ORDER — ATENOLOL 25 MG/1
1 TABLET ORAL
Qty: 0 | Refills: 0 | DISCHARGE

## 2020-09-04 RX ORDER — ALBUTEROL 90 UG/1
1 AEROSOL, METERED ORAL
Qty: 0 | Refills: 0 | DISCHARGE

## 2020-09-04 RX ORDER — SENNA PLUS 8.6 MG/1
2 TABLET ORAL AT BEDTIME
Refills: 0 | Status: DISCONTINUED | OUTPATIENT
Start: 2020-09-04 | End: 2020-09-06

## 2020-09-04 RX ORDER — APIXABAN 2.5 MG/1
1 TABLET, FILM COATED ORAL
Qty: 0 | Refills: 0 | DISCHARGE

## 2020-09-04 RX ADMIN — APIXABAN 5 MILLIGRAM(S): 2.5 TABLET, FILM COATED ORAL at 18:06

## 2020-09-04 RX ADMIN — Medication 30 MILLIGRAM(S): at 11:55

## 2020-09-04 RX ADMIN — APIXABAN 5 MILLIGRAM(S): 2.5 TABLET, FILM COATED ORAL at 05:26

## 2020-09-04 RX ADMIN — SENNA PLUS 2 TABLET(S): 8.6 TABLET ORAL at 22:06

## 2020-09-04 RX ADMIN — Medication 30 MILLIGRAM(S): at 05:26

## 2020-09-04 RX ADMIN — Medication 30 MILLIGRAM(S): at 00:33

## 2020-09-04 RX ADMIN — POLYETHYLENE GLYCOL 3350 17 GRAM(S): 17 POWDER, FOR SOLUTION ORAL at 13:12

## 2020-09-04 RX ADMIN — SODIUM CHLORIDE 65 MILLILITER(S): 9 INJECTION INTRAMUSCULAR; INTRAVENOUS; SUBCUTANEOUS at 00:33

## 2020-09-04 RX ADMIN — Medication 30 MILLIGRAM(S): at 18:06

## 2020-09-04 NOTE — PROGRESS NOTE ADULT - PROBLEM SELECTOR PLAN 5
-Patient has Covid in past s/p intubation and PEG.  -Pt has positive antibody.   -normal D dimers and LDH.  -f/u COVID 19 PCR.

## 2020-09-04 NOTE — CONSULT NOTE ADULT - ATTENDING COMMENTS
I have seen and examined the patient at bedside, and I agree with the history, examination, assessment, and plan. The patient's dizziness is likely secondary to orthostatic hypotension, which should be treated with conservative management. Midodrine or fludrocortisone may be considered if symptoms persist despite conservative steps. CT Head detected an incidental and benign right posterior meningioma, with size and location unlikely to be contributing to symptoms. The patient may be monitored long-term in Neurology clinic for symptoms and for routine imaging follow-up of meningioma.

## 2020-09-04 NOTE — PROGRESS NOTE ADULT - PROBLEM SELECTOR PLAN 1
-P/w dizziness, headache. s/p vomit x 3 at home.   -CT head -No acute intracranial hemorrhage.  1 x 0.2 x 0.7 cm right posterior parafalcine lesion, possibly a meningioma.  -Carotid doppler noted no acute pathology.  -orthostatics are positive, EKG showed NSR .  -continue telemetry  -continue gentle hydration  -Neurology (dr. Phan) consulted  -Follow ECHO cardiogram. -P/w dizziness, headache. s/p vomit x 3 at home.   -CT head -No acute intracranial hemorrhage.  1 x 0.2 x 0.7 cm right posterior parafalcine lesion, possibly a meningioma.  -Carotid doppler noted no acute pathology.  -orthostatics are positive, EKG showed NSR .  -continue telemetry  -trop negative x 2.  -continue gentle hydration  -Neurology (dr. Phan) consulted  -Follow ECHO cardiogram.  -f/u Trop #3

## 2020-09-04 NOTE — DISCHARGE NOTE PROVIDER - HOSPITAL COURSE
70 year old female with medical history of HTN, Afib, HLD, COVID s/p intubation and PEG (which has since been removed in August),  on home O2 (2L)  who presented to ED with complaint of Dizziness, associated with vomiting.    Pt is admitted for evaluation of dizziness. Carotid US not significant for stenosis. CTH negative for ICH. but right posterior parafalcine lesion, possibly a meningioma. Neurology consulted.     Passed swallowing test. Advanced diet. PT consulted. 70 year old female with medical history of HTN, Afib, HLD, COVID s/p intubation and PEG (which has since been removed in August),  on home O2 (2L)  who presented to ED with complaint of Dizziness, associated with vomiting.    Pt is admitted for evaluation of dizziness. Carotid US not significant for stenosis. CTH negative for ICH. but right posterior parafalcine lesion, possibly a meningioma. Neurology consulted.     Passed swallowing test. Advanced diet. PT consulted.             Patient was found to have orthostatic hypotension. Improved with IV fluids. Medically stable for discharge.

## 2020-09-04 NOTE — DISCHARGE NOTE PROVIDER - CARE PROVIDER_API CALL
Dr. Kenia Huff, PMRADHA  Phone: (   )    -  Fax: (   )    -  Follow Up Time: Dr. Kenia Huff, PMD  Phone: (   )    -  Fax: (   )    -  Follow Up Time:     Temitope Noel)  Critical Care Medicine; Pulmonary Disease  [address] [place] [address]  Phone: [phone]  Fax: [phone]  Follow Up Time: Temitope Noel)  Critical Care Medicine; Pulmonary Disease  [address], [address] [place] [id number]  Phone: [phone]  Fax: [phone]  Follow Up Time:     Dr. Kenia Huff, PMD  Phone: (   )    -  Fax: (   )    -  Follow Up Time:     Raul Phan)  Neurology  Epilepsy  [address] [place] [address], [address]  [place] [address]  Phone: [phone]  Follow Up Time:

## 2020-09-04 NOTE — DISCHARGE NOTE PROVIDER - NSDCCPCAREPLAN_GEN_ALL_CORE_FT
PRINCIPAL DISCHARGE DIAGNOSIS  Diagnosis: Dizziness  Assessment and Plan of Treatment: Head CT shows right posterior lesion, possible meningioma.   Carotid doppler shows no acute pathology.  Orthostatic blood pressure positive.   Maintain safety .  Fall precautions.  Maintain adequate hydration, nutrition, rest, activity as tolerated.  Follow-up with your primary care physician within 1 week. Call for appointment.        SECONDARY DISCHARGE DIAGNOSES  Diagnosis: Meningioma  Assessment and Plan of Treatment: Head CT shows right posterior lesion, possible meningioma.   Carotid doppler shows no acute pathology.  Orthostatic blood pressure positive.   Maintain safety .  Fall precautions.  Maintain adequate hydration, nutrition, rest, activity as tolerated.  Follow-up with your primary care physician within 1 week. Call for appointment.    Diagnosis: Afib  Assessment and Plan of Treatment: Continue with medication (s) as prescribed.  Report to your doctor any changes in your condition and or worsening symptoms.  Follow-up with your primary care physician/cardiologist . Call for appointment. PRINCIPAL DISCHARGE DIAGNOSIS  Diagnosis: Orthostatic hypotension  Assessment and Plan of Treatment: You were admitted to the hospital with dizziness and found to have orthostatic hypotension, meaning your blood pressure dropped significantly when you changing positions from sitting to standing. You were treated in the hospital with IV fluids and are clear for discharge at this time. No further testing is needed.      SECONDARY DISCHARGE DIAGNOSES  Diagnosis: 2019 novel coronavirus disease (COVID-19)  Assessment and Plan of Treatment: You were diagnosed with COVID-19 in the past and you chest xray shows a significant amount of post-infectious scarring, which is why you remain on home oxygen therapy. We advise you to follow up with Dr. Noel outpatient for pulmonology for continued monitoring of your respiratory function and needs.    Diagnosis: Meningioma  Assessment and Plan of Treatment: Your CT scan showed evidence of a possible meningioma. Our neurologist recommneded ______.    Diagnosis: Afib  Assessment and Plan of Treatment: Continue cardizem and Eliquis as prescribed. PRINCIPAL DISCHARGE DIAGNOSIS  Diagnosis: Orthostatic hypotension  Assessment and Plan of Treatment: You were admitted to the hospital with dizziness and found to have orthostatic hypotension, meaning your blood pressure dropped significantly when you changing positions from sitting to standing. You were treated in the hospital with IV fluids and are clear for discharge at this time. No further testing is needed. Caution with rapid changes in position      SECONDARY DISCHARGE DIAGNOSES  Diagnosis: 2019 novel coronavirus disease (COVID-19)  Assessment and Plan of Treatment: You were diagnosed with COVID-19 in the past and you chest xray shows a significant amount of post-infectious scarring, which is why you remain on home oxygen therapy. We advise you to follow up with Dr. Noel outpatient for pulmonology for continued monitoring of your respiratory function and needs.    Diagnosis: Meningioma  Assessment and Plan of Treatment: Your CT scan showed evidence of a possible meningioma. Our neurologist recommended follow up as out patient    Diagnosis: Afib  Assessment and Plan of Treatment: Continue cardizem and Eliquis as prescribed.

## 2020-09-04 NOTE — PHYSICAL THERAPY INITIAL EVALUATION ADULT - PERTINENT HX OF CURRENT PROBLEM, REHAB EVAL
Pt with history significant for COVID s/p intubation/peg placement and D/C fr [facility] on 7/2020 admitted with c/o dizziness. Pt is on home O2 PRN @ 2L/min. Last use of supplementary O2 was Aug 12

## 2020-09-04 NOTE — PROGRESS NOTE ADULT - PROBLEM SELECTOR PLAN 3
-CT head -No acute intracranial hemorrhage.  1 x 0.2 x 0.7 cm right posterior parafalcine lesion, possibly a meningioma.  -Neurology dr. Phan consulted.

## 2020-09-04 NOTE — DISCHARGE NOTE PROVIDER - CARE PROVIDERS DIRECT ADDRESSES
,DirectAddress_Unknown ,DirectAddress_Unknown,[email or web address],DirectAddress_Unknown,[email or web address]

## 2020-09-04 NOTE — DISCHARGE NOTE PROVIDER - PROVIDER TOKENS
FREE:[LAST:[Dr. Kenia Huff],FIRST:[PMD],PHONE:[(   )    -],FAX:[(   )    -]] FREE:[LAST:[Dr. Kenia Huff],FIRST:[PMD],PHONE:[(   )    -],FAX:[(   )    -]],PROVIDER:[TOKEN:[[id number]]] PROVIDER:[TOKEN:[[id number]]],FREE:[LAST:[Dr. Kenia Huff],FIRST:[PMD],PHONE:[(   )    -],FAX:[(   )    -]],PROVIDER:[TOKEN:[[id number]]]

## 2020-09-04 NOTE — PROGRESS NOTE ADULT - PROBLEM SELECTOR PLAN 4
-Patient is on Cardizem, Losartan at home.   -Holding losartan because blood pressure is stable can be restarted if needed.  -monitor blood pressure.  -f/u Echo

## 2020-09-04 NOTE — CONSULT NOTE ADULT - SUBJECTIVE AND OBJECTIVE BOX
Patient is a 70y old  Female who presents with a chief complaint of Dizziness (04 Sep 2020 11:09)      HPI:  70 year old female with medical history of HTN, Afib, HLD, COVID s/p intubation and PEG on home O2 (2L)  presented to ED for complaining of Dizziness. Patient states that she  was feeling very dizzy ( room spinning) since this morning associated with 3 episodes of vomiting , Vomitus contained water because off nausea she is did not ate any thing since this morning. She feels like she will fall and will loose balance. Patient states that she was diagnosed iwth Covid in march was admitted to Hospital was intubated had prolonged hospital course was intubated , Peg tube was placed later Patient was extubated and was discharged to Rehab. Patient was discharged from Rehab in July 2020. PEG tube was removed . Patient is able to tolerate PO diet for 3 weeks but states she still feels weak. Patient denies any chest pain, palpitations , head ache, fever, chills , or urinary problems (03 Sep 2020 18:05)    Pt reports when she woke up and stood up to walk she had dizziness-spinning sensation x 10 mins.  Spinning was asso w/ nausea and vomiting, however, denies HA at the time.       Neurological Review of Systems:  No difficulty with language.  No vision loss or double vision.  (+) Dizziness.  (+) Vertigo.  No new hearing loss.  No difficulty with speech or swallowing.  No focal weakness.  No focal sensory changes.  No numbness or tingling in the bilateral lower extremities.  No difficulty with balance.  No difficulty with ambulation.        MEDICATIONS  (STANDING):  apixaban 5 milliGRAM(s) Oral every 12 hours  diltiazem    Tablet 30 milliGRAM(s) Oral every 6 hours  senna 2 Tablet(s) Oral at bedtime  sodium chloride 0.9%. 1000 milliLiter(s) (65 mL/Hr) IV Continuous <Continuous>    MEDICATIONS  (PRN):    Allergies    aspirin (Unknown)    Intolerances      PAST MEDICAL & SURGICAL HISTORY:  Afib  HLD (hyperlipidemia)  HTN (hypertension)  Status post revision of total replacement of left knee  Knee joint replacement status, right    FAMILY HISTORY:    SOCIAL HISTORY: non smoker    Review of Systems:  Constitutional: No generalized weakness. No fevers or chills                Eyes, Ears, Mouth, Throat: No vision loss   Respiratory: No shortness of breath or cough                            Cardiovascular: No chest pain or palpitations  Gastrointestinal: No nausea or vomiting                                        Genitourinary: No urinary incontinence or burning on urination  Musculoskeletal: No joint pain                                                       Dermatologic: No rash  Neurological: as per HPI                                                                      Psychiatric: No behavioral problems  Endocrine: No known hypoglycemia               Hematologic/Lymphatic: No easy bleeding    O:  Vital Signs Last 24 Hrs  T(C): 36.6 (04 Sep 2020 11:12), Max: 36.7 (03 Sep 2020 15:29)  T(F): 97.8 (04 Sep 2020 11:12), Max: 98.1 (03 Sep 2020 15:29)  HR: 72 (04 Sep 2020 11:12) (62 - 77)  BP: 119/58 (04 Sep 2020 11:12) (110/66 - 130/68)  RR: 18 (04 Sep 2020 11:12) (18 - 20)  SpO2: 100% (04 Sep 2020 11:12) (98% - 100%)    General Exam:   General appearance: No acute distress                 Cardiovascular: Pedal dorsalis pulses intact bilaterally    Mental Status: Orientated to self, date and place.  Attention intact.  No dysarthria, aphasia or neglect.  Knowledge intact.  Registration intact.  Short and long term memory grossly intact.      Cranial Nerves: CN I - not tested.  PERRL, EOMI, VFF, no nystagmus or diplopia.  No APD.  Fundi not visualized.  CN V1-3 intact to light touch.  No facial asymmetry.  Hearing intact to finger rub bilaterally.  Tongue, uvula and palate midline.  Sternocleidomastoid and Trapezius intact bilaterally.    Motor:   Tone: Normal                  Strength intact throughout  No pronator drift bilaterally                      No dysmetria on finger-nose-finger or heel-shin-heel  No truncal ataxia.  No resting, postural or action tremor.  No myoclonus.    Sensation: intact to light touch    Deep Tendon Reflexes: 2+ bilateral biceps, triceps, brachioradialis, knee and ankle  Toes flexor bilaterally    Gait: normal and stable.  Rhomberg -kylee.    Other:     LABS:                        10.6   5.19  )-----------( 219      ( 04 Sep 2020 06:58 )             34.5     09-04    142  |  112<H>  |  12  ----------------------------<  83  4.2   |  27  |  0.80    Ca    7.5<L>      04 Sep 2020 06:58  Phos  2.5     09-04  Mg     2.6     09-04    TPro  6.6  /  Alb  2.9<L>  /  TBili  0.5  /  DBili  x   /  AST  13  /  ALT  14  /  AlkPhos  56  09-04    PT/INR - ( 03 Sep 2020 10:49 )   PT: 13.2 sec;   INR: 1.14 ratio         PTT - ( 03 Sep 2020 10:49 )  PTT:42.1 sec        RADIOLOGY & ADDITIONAL STUDIES:  < from: CT Head No Cont (09.03.20 @ 09:38) >  EXAM:  CT BRAIN                            PROCEDURE DATE:  09/03/2020          INTERPRETATION:  CLINICAL INFORMATION: dizziness, vomiting, recent covid. . .    TECHNIQUE: Sequential axial images were obtained from the vertex to the skull base without intravenous contrast. Coronal and sagittal reformations were obtained.    COMPARISON: None .    FINDINGS:    1 x 0.2 x 0.7 cm right posterior parafalcine lesion, possibly a meningioma (2:39). There is no acute intracranial hemorrhage. The ventricles and sulci are normal in size for patient's age.    There is no extraaxial fluid collection. Partially empty sella.    There is no displaced calvarial fracture. Status post bilateral intraocular lens implants. The visualized portions of the paranasal sinuses are well aerated. The mastoid air cells are well aerated.      IMPRESSION:  1.  No acute intracranial hemorrhage.  2.  1 x 0.2 x 0.7 cm right posterior parafalcine lesion, possibly a meningioma.              CHERELLE ALMODOVAR M.D., ATTENDING RADIOLOGIST  This document has been electronically signed. Sep  3 2020  9:50AM                < end of copied text >  < from: US Duplex Carotid Arteries Complete, Bilateral (09.03.20 @ 19:29) >    EXAM:  US DPLX CAROTIDS COMPL BI                            PROCEDURE DATE:  09/03/2020          INTERPRETATION:  CLINICAL STATEMENT: Evaluate for carotid artery stenosis.  Presyncope.    TECHNIQUE: Carotid artery ultrasound was performed.    COMPARISON: None.    FINDINGS:    Peak systolic velocity measurements in centimeters per second as described below.    RIGHT:    CCA: 63  ICA: 67  ICA/CCA ratio: 1.0  There is antegrade flow in the right vertebral artery.    LEFT:    CCA: 82  ICA: 73  ICA/CCA ratio: 0.9  There is antegrade flow in the left vertebral artery.    IMPRESSION:    No evidence of hemodynamically significant stenosis by velocity measurements.    Measurement of carotid stenosis is based on velocity parameters that  correlate the residual internal carotid diameter with that of the more  distal vessel in accordance with a method such as the North American  Symptomatic Carotid Endarterectomy Trial (NASCET).                  DOUG TAPIA M.D.,ATTENDING RADIOLOGIST  This document has been electronically signed. Sep  3 2020  8:00PM    < end of copied text > Patient is a 70y old  Female who presents with a chief complaint of Dizziness (04 Sep 2020 11:09)      HPI:  70 year old female with medical history of HTN, Afib, HLD, COVID s/p intubation and PEG on home O2 (2L)  presented to ED for complaining of Dizziness. Patient states that she  was feeling very dizzy ( room spinning) since this morning associated with 3 episodes of vomiting , Vomitus contained water because off nausea she is did not ate any thing since this morning. She feels like she will fall and will loose balance. Patient states that she was diagnosed iwth Covid in march was admitted to Hospital was intubated had prolonged hospital course was intubated , Peg tube was placed later Patient was extubated and was discharged to Rehab. Patient was discharged from Rehab in July 2020. PEG tube was removed . Patient is able to tolerate PO diet for 3 weeks but states she still feels weak. Patient denies any chest pain, palpitations , head ache, fever, chills , or urinary problems (03 Sep 2020 18:05)    Pt reports when she woke up and stood up to walk she had dizziness-spinning sensation x 10 mins.  Spinning was asso w/ nausea and vomiting, however, denies HA at the time.       Neurological Review of Systems:  No difficulty with language.  No vision loss or double vision.  (+) Dizziness.  (+) Vertigo.  No new hearing loss.  No difficulty with speech or swallowing.  No focal weakness.  No focal sensory changes.  No numbness or tingling in the bilateral lower extremities.  No difficulty with balance.  No difficulty with ambulation.        MEDICATIONS  (STANDING):  apixaban 5 milliGRAM(s) Oral every 12 hours  diltiazem    Tablet 30 milliGRAM(s) Oral every 6 hours  senna 2 Tablet(s) Oral at bedtime  sodium chloride 0.9%. 1000 milliLiter(s) (65 mL/Hr) IV Continuous <Continuous>    MEDICATIONS  (PRN):    Allergies    aspirin (Unknown)    Intolerances      PAST MEDICAL & SURGICAL HISTORY:  Afib  HLD (hyperlipidemia)  HTN (hypertension)  Status post revision of total replacement of left knee  Knee joint replacement status, right    FAMILY HISTORY:    SOCIAL HISTORY: non smoker    Review of Systems:  Constitutional: No generalized weakness. No fevers or chills                Eyes, Ears, Mouth, Throat: No vision loss   Respiratory: No shortness of breath or cough                            Cardiovascular: No chest pain or palpitations  Gastrointestinal: No nausea or vomiting                                        Genitourinary: No urinary incontinence or burning on urination  Musculoskeletal: No joint pain                                                       Dermatologic: No rash  Neurological: as per HPI                                                                      Psychiatric: No behavioral problems  Endocrine: No known hypoglycemia               Hematologic/Lymphatic: No easy bleeding    O:  Vital Signs Last 24 Hrs  T(C): 36.6 (04 Sep 2020 11:12), Max: 36.7 (03 Sep 2020 15:29)  T(F): 97.8 (04 Sep 2020 11:12), Max: 98.1 (03 Sep 2020 15:29)  HR: 72 (04 Sep 2020 11:12) (62 - 77)  BP: 119/58 (04 Sep 2020 11:12) (110/66 - 130/68)  RR: 18 (04 Sep 2020 11:12) (18 - 20)  SpO2: 100% (04 Sep 2020 11:12) (98% - 100%)    General Exam:   General appearance: No acute distress                 Cardiovascular: Pedal dorsalis pulses intact bilaterally    Mental Status: Orientated to self, date and place.  Attention intact.  No dysarthria, aphasia or neglect.  Knowledge intact.  Registration intact.  Short and long term memory grossly intact.      Cranial Nerves: CN I - not tested.  EOMI, VFF, no nystagmus or diplopia.  No APD.  Fundi not visualized.  CN V1-3 intact to light touch.  No facial asymmetry.  Hearing intact to finger rub bilaterally.  Tongue, uvula and palate midline.  Sternocleidomastoid and Trapezius intact bilaterally.    Motor:   Tone: Normal                  Strength intact throughout  No pronator drift bilaterally                      No dysmetria on finger-nose-finger or heel-shin-heel  No truncal ataxia.  No resting, postural or action tremor.  No myoclonus.    Sensation: intact to light touch    Deep Tendon Reflexes: 2+ bilateral biceps, triceps, brachioradialis, knee and ankle  Toes flexor bilaterally    Gait: normal and stable.  Rhomberg -kylee.    Other:     LABS:                        10.6   5.19  )-----------( 219      ( 04 Sep 2020 06:58 )             34.5     09-04    142  |  112<H>  |  12  ----------------------------<  83  4.2   |  27  |  0.80    Ca    7.5<L>      04 Sep 2020 06:58  Phos  2.5     09-04  Mg     2.6     09-04    TPro  6.6  /  Alb  2.9<L>  /  TBili  0.5  /  DBili  x   /  AST  13  /  ALT  14  /  AlkPhos  56  09-04    PT/INR - ( 03 Sep 2020 10:49 )   PT: 13.2 sec;   INR: 1.14 ratio         PTT - ( 03 Sep 2020 10:49 )  PTT:42.1 sec        RADIOLOGY & ADDITIONAL STUDIES:  < from: CT Head No Cont (09.03.20 @ 09:38) >  EXAM:  CT BRAIN                            PROCEDURE DATE:  09/03/2020          INTERPRETATION:  CLINICAL INFORMATION: dizziness, vomiting, recent covid. . .    TECHNIQUE: Sequential axial images were obtained from the vertex to the skull base without intravenous contrast. Coronal and sagittal reformations were obtained.    COMPARISON: None .    FINDINGS:    1 x 0.2 x 0.7 cm right posterior parafalcine lesion, possibly a meningioma (2:39). There is no acute intracranial hemorrhage. The ventricles and sulci are normal in size for patient's age.    There is no extraaxial fluid collection. Partially empty sella.    There is no displaced calvarial fracture. Status post bilateral intraocular lens implants. The visualized portions of the paranasal sinuses are well aerated. The mastoid air cells are well aerated.      IMPRESSION:  1.  No acute intracranial hemorrhage.  2.  1 x 0.2 x 0.7 cm right posterior parafalcine lesion, possibly a meningioma.              CHERELLE ALMODOVAR M.D., ATTENDING RADIOLOGIST  This document has been electronically signed. Sep  3 2020  9:50AM                < end of copied text >  < from: US Duplex Carotid Arteries Complete, Bilateral (09.03.20 @ 19:29) >    EXAM:  US DPLX CAROTIDS COMPL BI                            PROCEDURE DATE:  09/03/2020          INTERPRETATION:  CLINICAL STATEMENT: Evaluate for carotid artery stenosis.  Presyncope.    TECHNIQUE: Carotid artery ultrasound was performed.    COMPARISON: None.    FINDINGS:    Peak systolic velocity measurements in centimeters per second as described below.    RIGHT:    CCA: 63  ICA: 67  ICA/CCA ratio: 1.0  There is antegrade flow in the right vertebral artery.    LEFT:    CCA: 82  ICA: 73  ICA/CCA ratio: 0.9  There is antegrade flow in the left vertebral artery.    IMPRESSION:    No evidence of hemodynamically significant stenosis by velocity measurements.    Measurement of carotid stenosis is based on velocity parameters that  correlate the residual internal carotid diameter with that of the more  distal vessel in accordance with a method such as the North American  Symptomatic Carotid Endarterectomy Trial (NASCET).                  DOUG TAPIA M.D.,ATTENDING RADIOLOGIST  This document has been electronically signed. Sep  3 2020  8:00PM    < end of copied text > Patient is a 70y old  Female who presents with a chief complaint of Dizziness (04 Sep 2020 11:09)    HPI:  70 year old female with medical history of HTN, Afib, HLD, COVID s/p intubation and PEG on home O2 (2L)  presented to ED for complaining of Dizziness. Patient states that she  was feeling very dizzy ( room spinning) since this morning associated with 3 episodes of vomiting , Vomitus contained water because off nausea she is did not ate any thing since this morning. She feels like she will fall and will loose balance. Patient states that she was diagnosed iwth Covid in march was admitted to Hospital was intubated had prolonged hospital course was intubated , Peg tube was placed later Patient was extubated and was discharged to Rehab. Patient was discharged from Rehab in July 2020. PEG tube was removed . Patient is able to tolerate PO diet for 3 weeks but states she still feels weak. Patient denies any chest pain, palpitations , head ache, fever, chills , or urinary problems (03 Sep 2020 18:05)    Pt reports when she woke up and stood up to walk she had dizziness-spinning sensation x 10 mins.  Spinning was asso w/ nausea and vomiting, however, denies HA at the time.       Neurological Review of Systems:  No difficulty with language.  No vision loss or double vision.  (+) Dizziness.  (+) Vertigo.  No new hearing loss.  No difficulty with speech or swallowing.  No focal weakness.  No focal sensory changes.  No numbness or tingling in the bilateral lower extremities.  No difficulty with balance.  No difficulty with ambulation.      MEDICATIONS  (STANDING):  apixaban 5 milliGRAM(s) Oral every 12 hours  diltiazem    Tablet 30 milliGRAM(s) Oral every 6 hours  senna 2 Tablet(s) Oral at bedtime  sodium chloride 0.9%. 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 No fevers or chills                Eyes, Ears, Mouth, Throat: No vision loss   Respiratory: No shortness of breath or cough                            Cardiovascular: No chest pain or palpitations  Gastrointestinal: No nausea or vomiting                                        Genitourinary: No urinary incontinence or burning on urination  Musculoskeletal: No joint pain                                                       Dermatologic: No rash  Neurological: as per HPI                                                                      Psychiatric: No behavioral problems  Endocrine: No known hypoglycemia               Hematologic/Lymphatic: No easy bleeding      O:  Vital Signs Last 24 Hrs  T(C): 36.6 (04 Sep 2020 11:12), Max: 36.7 (03 Sep 2020 15:29)  T(F): 97.8 (04 Sep 2020 11:12), Max: 98.1 (03 Sep 2020 15:29)  HR: 72 (04 Sep 2020 11:12) (62 - 77)  BP: 119/58 (04 Sep 2020 11:12) (110/66 - 130/68)  RR: 18 (04 Sep 2020 11:12) (18 - 20)  SpO2: 100% (04 Sep 2020 11:12) (98% - 100%)    General Exam:   General appearance: No acute distress                 Cardiovascular: Pedal dorsalis pulses intact bilaterally    Mental Status: Orientated to self, date and place.  Attention intact.  No dysarthria, aphasia or neglect.  Knowledge intact.  Registration intact.  Short and long term memory grossly intact.      Cranial Nerves: CN I - not tested.  EOMI x 2, VFF x 4, no nystagmus or diplopia.  No APD.  Fundi not visualized.  CN V1-3 intact to light touch.  No facial asymmetry.  Hearing intact to finger rub bilaterally.  Negative Danuta-Hallpike maneuver b/l.  Tongue, uvula and palate midline.  Sternocleidomastoid and Trapezius intact bilaterally.    Motor:   Tone: Normal                  Strength intact throughout  No pronator drift bilaterally                      No dysmetria on finger-nose-finger or heel-shin-heel  No truncal ataxia.  No resting, postural or action tremor.  No myoclonus.    Sensation: intact to light touch    Deep Tendon Reflexes: 2+ bilateral biceps, triceps, brachioradialis, knee and ankle  Toes flexor bilaterally    Gait: normal and stable.  Romberg negative.      LABS:                        10.6   5.19  )-----------( 219      ( 04 Sep 2020 06:58 )             34.5     09-04    142  |  112<H>  |  12  ----------------------------<  83  4.2   |  27  |  0.80    Ca    7.5<L>      04 Sep 2020 06:58  Phos  2.5     09-04  Mg     2.6     09-04    TPro  6.6  /  Alb  2.9<L>  /  TBili  0.5  /  DBili  x   /  AST  13  /  ALT  14  /  AlkPhos  56  09-04    PT/INR - ( 03 Sep 2020 10:49 )   PT: 13.2 sec;   INR: 1.14 ratio    PTT - ( 03 Sep 2020 10:49 )  PTT:42.1 sec        RADIOLOGY & ADDITIONAL STUDIES:  < from: CT Head No Cont (09.03.20 @ 09:38) >  EXAM:  CT BRAIN                            PROCEDURE DATE:  09/03/2020          INTERPRETATION:  CLINICAL INFORMATION: dizziness, vomiting, recent covid. . .    TECHNIQUE: Sequential axial images were obtained from the vertex to the skull base without intravenous contrast. Coronal and sagittal reformations were obtained.    COMPARISON: None .    FINDINGS:    1 x 0.2 x 0.7 cm right posterior parafalcine lesion, possibly a meningioma (2:39). There is no acute intracranial hemorrhage. The ventricles and sulci are normal in size for patient's age.    There is no extraaxial fluid collection. Partially empty sella.    There is no displaced calvarial fracture. Status post bilateral intraocular lens implants. The visualized portions of the paranasal sinuses are well aerated. The mastoid air cells are well aerated.      IMPRESSION:  1.  No acute intracranial hemorrhage.  2.  1 x 0.2 x 0.7 cm right posterior parafalcine lesion, possibly a meningioma.              CHERELLE ALMODOVAR M.D., ATTENDING RADIOLOGIST  This document has been electronically signed. Sep  3 2020  9:50AM                < end of copied text >  < from: US Duplex Carotid Arteries Complete, Bilateral (09.03.20 @ 19:29) >    EXAM:  US DPLX CAROTIDS COMPL BI                            PROCEDURE DATE:  09/03/2020          INTERPRETATION:  CLINICAL STATEMENT: Evaluate for carotid artery stenosis.  Presyncope.    TECHNIQUE: Carotid artery ultrasound was performed.    COMPARISON: None.    FINDINGS:    Peak systolic velocity measurements in centimeters per second as described below.    RIGHT:    CCA: 63  ICA: 67  ICA/CCA ratio: 1.0  There is antegrade flow in the right vertebral artery.    LEFT:    CCA: 82  ICA: 73  ICA/CCA ratio: 0.9  There is antegrade flow in the left vertebral artery.    IMPRESSION:    No evidence of hemodynamically significant stenosis by velocity measurements.    Measurement of carotid stenosis is based on velocity parameters that  correlate the residual internal carotid diameter with that of the more  distal vessel in accordance with a method such as the North American  Symptomatic Carotid Endarterectomy Trial (NASCET).                  DOUG TAPIA M.D.,ATTENDING RADIOLOGIST  This document has been electronically signed. Sep  3 2020  8:00PM    < end of copied text >

## 2020-09-04 NOTE — PHYSICAL THERAPY INITIAL EVALUATION ADULT - REFERRING PHYSICIAN, REHAB EVAL
Presents to ED for CP which began at 1530 today. Reports having two episodes of vomiting after his dinner break.  Hx of gastroenteritis. Reddy James

## 2020-09-04 NOTE — CONSULT NOTE ADULT - ASSESSMENT
69yo female w/ transient dizziness 69yo female w/ transient dizziness d/t orthostatic hypotension    Recommendations:    -Encourage plentiful hydration    -Caution during position changes     -Continued mgmt per primary team     -PT as tolerated

## 2020-09-04 NOTE — PROGRESS NOTE ADULT - SUBJECTIVE AND OBJECTIVE BOX
NP Note discussed with  Primary Attending    Patient is a 70y old  Female who presents with a chief complaint of Dizziness (03 Sep 2020 18:05)    HPI- 70 year old female with medical history of HTN, Afib, HLD, COVID s/p intubation and PEG on home O2 (2L)  presented to ED for complaining of Dizziness. Patient states that she  was feeling very dizzy ( room spinning) since this morning associated with 3 episodes of vomiting , Vomitus contained water because off nausea she is did not ate any thing since this morning. She feels like she will fall and will loose balance. Patient states that she was diagnosed iwth Covid in march was admitted to Hospital was intubated had prolonged hospital course was intubated , Peg tube was placed later Patient was extubated and was discharged to Rehab. Patient was discharged from Rehab in July 2020. PEG tube was removed in August. Patient is able to tolerate PO diet for 3 weeks but states she still feels weak. Patient denies any chest pain, palpitations , head ache, fever, chills , or urinary problems   Pt is admitted for evaluation of dizziness. Carotid US not significant for stenosis. CTH negative for ICH. but right posterior parafalcine lesion, possibly a meningioma. Neurology consulted.   Passed swallowing test. Advanced diet. PT consulted.     INTERVAL HPI/OVERNIGHT EVENTS:  seen at bedside. pt states feeling much better. less dizzy this morning.     MEDICATIONS  (STANDING):  apixaban 5 milliGRAM(s) Oral every 12 hours  diltiazem    Tablet 30 milliGRAM(s) Oral every 6 hours  sodium chloride 0.9%. 1000 milliLiter(s) (65 mL/Hr) IV Continuous <Continuous>    MEDICATIONS  (PRN):      __________________________________________________  REVIEW OF SYSTEMS:    CONSTITUTIONAL: No fever,   EYES: no acute visual disturbances  NECK: No pain or stiffness  RESPIRATORY: No cough; No shortness of breath  CARDIOVASCULAR: No chest pain, no palpitations  GASTROINTESTINAL: No pain. No nausea or vomiting; No diarrhea   NEUROLOGICAL: No headache or numbness, no tremors, mild dizziness   MUSCULOSKELETAL: No joint pain, no muscle pain  GENITOURINARY: no dysuria, no frequency, no hesitancy  PSYCHIATRY: no depression , no anxiety  ALL OTHER  ROS negative        Vital Signs Last 24 Hrs  T(C): 36.1 (04 Sep 2020 08:03), Max: 36.7 (03 Sep 2020 15:29)  T(F): 97 (04 Sep 2020 08:03), Max: 98.1 (03 Sep 2020 15:29)  HR: 62 (04 Sep 2020 00:27) (62 - 72)  BP: 123/77 (03 Sep 2020 19:23) (110/66 - 123/77)  BP(mean): --  RR: 20 (04 Sep 2020 06:16) (20 - 20)  SpO2: 100% (04 Sep 2020 06:16) (100% - 100%)    ________________________________________________  PHYSICAL EXAM:  GENERAL: NAD, conversant. comprehensive.   HEENT: Normocephalic;  conjunctivae and sclerae clear; moist mucous membranes;   NECK : supple  CHEST/LUNG: Clear to auscultation bilaterally with good air entry   HEART: S1 S2  regular; no murmurs, gallops or rubs  ABDOMEN: Soft, Nontender, Nondistended; Bowel sounds present, small opening mid abdomen-old peg site. no discharges observed.   EXTREMITIES: no cyanosis; no edema; no calf tenderness  SKIN: warm and dry; no rash  NERVOUS SYSTEM:  Awake and alert; Oriented  to place, person and forgetful with time ; lower leg weakness    _________________________________________________  LABS:                        10.6   5.19  )-----------( 219      ( 04 Sep 2020 06:58 )             34.5     09-04    142  |  112<H>  |  12  ----------------------------<  83  4.2   |  27  |  0.80    Ca    7.5<L>      04 Sep 2020 06:58  Phos  2.5     09-04  Mg     2.6     09-04    TPro  6.6  /  Alb  2.9<L>  /  TBili  0.5  /  DBili  x   /  AST  13  /  ALT  14  /  AlkPhos  56  09-04    PT/INR - ( 03 Sep 2020 10:49 )   PT: 13.2 sec;   INR: 1.14 ratio         PTT - ( 03 Sep 2020 10:49 )  PTT:42.1 sec    CAPILLARY BLOOD GLUCOSE            RADIOLOGY & ADDITIONAL TESTS:    Imaging  Reviewed:  YES    < from: CT Head No Cont (09.03.20 @ 09:38) >    EXAM:  CT BRAIN                            PROCEDURE DATE:  09/03/2020          INTERPRETATION:  CLINICAL INFORMATION: dizziness, vomiting, recent covid. . .    TECHNIQUE: Sequential axial images were obtained from the vertex to the skull base without intravenous contrast. Coronal and sagittal reformations were obtained.    COMPARISON: None .    FINDINGS:    1 x 0.2 x 0.7 cm right posterior parafalcine lesion, possibly a meningioma (2:39). There is no acute intracranial hemorrhage. The ventricles and sulci are normal in size for patient's age.    There is no extraaxial fluid collection. Partially empty sella.    There is no displaced calvarial fracture. Status post bilateral intraocular lens implants. The visualized portions of the paranasal sinuses are well aerated. The mastoid air cells are well aerated.      IMPRESSION:  1.  No acute intracranial hemorrhage.  2.  1 x 0.2 x 0.7 cm right posterior parafalcine lesion, possibly a meningioma.              CHERELLE ALMODOVAR M.D., ATTENDING RADIOLOGIST  This document has been electronically signed. Sep  3 2020  9:50AM                < end of copied text >    < from: US Duplex Carotid Arteries Complete, Bilateral (09.03.20 @ 19:29) >    EXAM:  US DPLX CAROTIDS COMPL BI                            PROCEDURE DATE:  09/03/2020          INTERPRETATION:  CLINICAL STATEMENT: Evaluate for carotid artery stenosis.  Presyncope.    TECHNIQUE: Carotid artery ultrasound was performed.    COMPARISON: None.    FINDINGS:    Peak systolic velocity measurements in centimeters per second as described below.    RIGHT:    CCA: 63  ICA: 67  ICA/CCA ratio: 1.0  There is antegrade flow in the right vertebral artery.    LEFT:    CCA: 82  ICA: 73  ICA/CCA ratio: 0.9  There is antegrade flow in the left vertebral artery.    IMPRESSION:    No evidence of hemodynamically significant stenosis by velocity measurements.    Measurement of carotid stenosis is based on velocity parameters that  correlate the residual internal carotid diameter with that of the more  distal vessel in accordance with a method such as the [place] American  Symptomatic Carotid Endarterectomy Trial (NASCET).                  DOUG TAPIA M.D.,ATTENDING RADIOLOGIST  This document has been electronically signed. Sep  3 2020  8:00PM    < end of copied text >    < from: Xray Chest 1 View- PORTABLE-Urgent (09.03.20 @ 10:19) >    EXAM:  XR CHEST PORTABLE URGENT 1V                            PROCEDURE DATE:  09/03/2020          INTERPRETATION:  AP semierect chest on September 3, 2020 at 9:56 AM. Patient is short of breath. Patient was critically on March for the Covid disease.    COMPARISON: None available.    Heart is magnified by technique.    There is a diffuse roughly symmetric interstitial infiltrative pattern throughout all lung fields. This likely represents a residual of the Covid infection. Acute component of disease is difficult to entirely exclude.    IMPRESSION: As above.            LISHA VERONICA M.D., ATTENDING RADIOLOGIST  This document has been electronically signed. Sep  3 2020  1:37PM    < end of copied text >      Consultant(s) Notes Reviewed:   YES    Neurology    Plan of care was discussed with patient and /or primary care giver; all questions and concerns were addressed NP Note discussed with  Primary Attending    Patient is a 70y old  Female who presents with a chief complaint of Dizziness (03 Sep 2020 18:05)    HPI- 70 year old female with medical history of HTN, Afib, HLD, COVID s/p intubation and PEG on home O2 (2L)  presented to ED for complaining of Dizziness. Patient states that she  was feeling very dizzy ( room spinning) since this morning associated with 3 episodes of vomiting , Vomitus contained water because off nausea she is did not ate any thing since this morning. She feels like she will fall and will loose balance. Patient states that she was diagnosed iwth Covid in march was admitted to Hospital was intubated had prolonged hospital course was intubated , Peg tube was placed later Patient was extubated and was discharged to Rehab. Patient was discharged from Rehab in July 2020. PEG tube was removed in August. Patient is able to tolerate PO diet for 3 weeks but states she still feels weak. Patient denies any chest pain, palpitations , head ache, fever, chills , or urinary problems   Pt is admitted for evaluation of dizziness. Carotid US not significant for stenosis. CTH negative for ICH. but right posterior parafalcine lesion, possibly a meningioma. Neurology consulted. Trop negative x 2.   Passed swallowing test. Advanced diet. PT consulted.     INTERVAL HPI/OVERNIGHT EVENTS:  seen at bedside. pt states feeling much better. less dizzy this morning.     MEDICATIONS  (STANDING):  apixaban 5 milliGRAM(s) Oral every 12 hours  diltiazem    Tablet 30 milliGRAM(s) Oral every 6 hours  sodium chloride 0.9%. 1000 milliLiter(s) (65 mL/Hr) IV Continuous <Continuous>    MEDICATIONS  (PRN):      __________________________________________________  REVIEW OF SYSTEMS:    CONSTITUTIONAL: No fever,   EYES: no acute visual disturbances  NECK: No pain or stiffness  RESPIRATORY: No cough; No shortness of breath  CARDIOVASCULAR: No chest pain, no palpitations  GASTROINTESTINAL: No pain. No nausea or vomiting; No diarrhea   NEUROLOGICAL: No headache or numbness, no tremors, mild dizziness   MUSCULOSKELETAL: No joint pain, no muscle pain  GENITOURINARY: no dysuria, no frequency, no hesitancy  PSYCHIATRY: no depression , no anxiety  ALL OTHER  ROS negative        Vital Signs Last 24 Hrs  T(C): 36.1 (04 Sep 2020 08:03), Max: 36.7 (03 Sep 2020 15:29)  T(F): 97 (04 Sep 2020 08:03), Max: 98.1 (03 Sep 2020 15:29)  HR: 62 (04 Sep 2020 00:27) (62 - 72)  BP: 123/77 (03 Sep 2020 19:23) (110/66 - 123/77)  BP(mean): --  RR: 20 (04 Sep 2020 06:16) (20 - 20)  SpO2: 100% (04 Sep 2020 06:16) (100% - 100%)    ________________________________________________  PHYSICAL EXAM:  GENERAL: NAD, conversant. comprehensive.   HEENT: Normocephalic;  conjunctivae and sclerae clear; moist mucous membranes;   NECK : supple  CHEST/LUNG: Clear to auscultation bilaterally with good air entry   HEART: S1 S2  regular; no murmurs, gallops or rubs  ABDOMEN: Soft, Nontender, Nondistended; Bowel sounds present, small opening mid abdomen-old peg site. no discharges observed.   EXTREMITIES: no cyanosis; no edema; no calf tenderness  SKIN: warm and dry; no rash  NERVOUS SYSTEM:  Awake and alert; Oriented  to place, person and forgetful with time ; lower leg weakness    _________________________________________________  LABS:                        10.6   5.19  )-----------( 219      ( 04 Sep 2020 06:58 )             34.5     09-04    142  |  112<H>  |  12  ----------------------------<  83  4.2   |  27  |  0.80    Ca    7.5<L>      04 Sep 2020 06:58  Phos  2.5     09-04  Mg     2.6     09-04    TPro  6.6  /  Alb  2.9<L>  /  TBili  0.5  /  DBili  x   /  AST  13  /  ALT  14  /  AlkPhos  56  09-04    PT/INR - ( 03 Sep 2020 10:49 )   PT: 13.2 sec;   INR: 1.14 ratio         PTT - ( 03 Sep 2020 10:49 )  PTT:42.1 sec    CAPILLARY BLOOD GLUCOSE            RADIOLOGY & ADDITIONAL TESTS:    Imaging  Reviewed:  YES    < from: CT Head No Cont (09.03.20 @ 09:38) >    EXAM:  CT BRAIN                            PROCEDURE DATE:  09/03/2020          INTERPRETATION:  CLINICAL INFORMATION: dizziness, vomiting, recent covid. . .    TECHNIQUE: Sequential axial images were obtained from the vertex to the skull base without intravenous contrast. Coronal and sagittal reformations were obtained.    COMPARISON: None .    FINDINGS:    1 x 0.2 x 0.7 cm right posterior parafalcine lesion, possibly a meningioma (2:39). There is no acute intracranial hemorrhage. The ventricles and sulci are normal in size for patient's age.    There is no extraaxial fluid collection. Partially empty sella.    There is no displaced calvarial fracture. Status post bilateral intraocular lens implants. The visualized portions of the paranasal sinuses are well aerated. The mastoid air cells are well aerated.      IMPRESSION:  1.  No acute intracranial hemorrhage.  2.  1 x 0.2 x 0.7 cm right posterior parafalcine lesion, possibly a meningioma.              CHERELLE ALMODOVAR M.D., ATTENDING RADIOLOGIST  This document has been electronically signed. Sep  3 2020  9:50AM                < end of copied text >    < from: US Duplex Carotid Arteries Complete, Bilateral (09.03.20 @ 19:29) >    EXAM:  US DPLX CAROTIDS COMPL BI                            PROCEDURE DATE:  09/03/2020          INTERPRETATION:  CLINICAL STATEMENT: Evaluate for carotid artery stenosis.  Presyncope.    TECHNIQUE: Carotid artery ultrasound was performed.    COMPARISON: None.    FINDINGS:    Peak systolic velocity measurements in centimeters per second as described below.    RIGHT:    CCA: 63  ICA: 67  ICA/CCA ratio: 1.0  There is antegrade flow in the right vertebral artery.    LEFT:    CCA: 82  ICA: 73  ICA/CCA ratio: 0.9  There is antegrade flow in the left vertebral artery.    IMPRESSION:    No evidence of hemodynamically significant stenosis by velocity measurements.    Measurement of carotid stenosis is based on velocity parameters that  correlate the residual internal carotid diameter with that of the more  distal vessel in accordance with a method such as the North American  Symptomatic Carotid Endarterectomy Trial (NASCET).                  DOUG TAPIA M.D.,ATTENDING RADIOLOGIST  This document has been electronically signed. Sep  3 2020  8:00PM    < end of copied text >    < from: Xray Chest 1 View- PORTABLE-Urgent (09.03.20 @ 10:19) >    EXAM:  XR CHEST PORTABLE URGENT 1V                            PROCEDURE DATE:  09/03/2020          INTERPRETATION:  AP semierect chest on September 3, 2020 at 9:56 AM. Patient is short of breath. Patient was critically on March for the Covid disease.    COMPARISON: None available.    Heart is magnified by technique.    There is a diffuse roughly symmetric interstitial infiltrative pattern throughout all lung fields. This likely represents a residual of the Covid infection. Acute component of disease is difficult to entirely exclude.    IMPRESSION: As above.            LISHA VERONICA M.D., ATTENDING RADIOLOGIST  This document has been electronically signed. Sep  3 2020  1:37PM    < end of copied text >      Consultant(s) Notes Reviewed:   YES    Neurology    Plan of care was discussed with patient and /or primary care giver; all questions and concerns were addressed

## 2020-09-04 NOTE — PROGRESS NOTE ADULT - PROBLEM SELECTOR PROBLEM 8
MOM SAID HE HAS A SORE THROAT, SHE IS ASKING FOR HIM TO BE SEEN TODAY OR AT LEAST DO A QUICK STREP TEST.   PLEASE CALL HER Goals of care, counseling/discussion

## 2020-09-04 NOTE — DISCHARGE NOTE PROVIDER - NSDCMRMEDTOKEN_GEN_ALL_CORE_FT
albuterol 2 mg oral tablet: 1 tab(s) orally 3 times a day (with meals)  atenolol 50 mg oral tablet: 1 tab(s) orally once a day  Calcium 600+D 600 mg-200 intl units (5 mcg) oral tablet: 1 tab(s) orally 3 times a day  dilTIAZem 360 mg/24 hours oral tablet, extended release: 1 tab(s) orally once a day  Eliquis 5 mg oral tablet: 1 tab(s) orally 2 times a day  hydrOXYzine hydrochloride 25 mg oral tablet: 1 tab(s) orally once a day (at bedtime)  losartan 50 mg oral tablet: 1 tab(s) orally once a day  losartan 50 mg oral tablet: 1 tab(s) orally once a day  Restasis 0.05% ophthalmic emulsion: 1 drop(s) to each affected eye every 12 hours, As Needed  traMADol 50 mg oral tablet: 1 tab(s) orally every 4 hours, As Needed albuterol 2 mg oral tablet: 1 tab(s) orally 3 times a day (with meals)  atenolol 50 mg oral tablet: 1 tab(s) orally once a day  Calcium 600+D 600 mg-200 intl units (5 mcg) oral tablet: 1 tab(s) orally 3 times a day  dilTIAZem 30 mg oral tablet: 1 tab(s) orally every 6 hours  Eliquis 5 mg oral tablet: 1 tab(s) orally 2 times a day  hydrOXYzine hydrochloride 25 mg oral tablet: 1 tab(s) orally once a day (at bedtime)  losartan 50 mg oral tablet: 1 tab(s) orally once a day  Restasis 0.05% ophthalmic emulsion: 1 drop(s) to each affected eye every 12 hours, As Needed  senna oral tablet: 2 tab(s) orally once a day (at bedtime)  traMADol 50 mg oral tablet: 1 tab(s) orally every 4 hours, As Needed

## 2020-09-05 LAB
ALBUMIN SERPL ELPH-MCNC: 3.2 G/DL — LOW (ref 3.5–5)
ALP SERPL-CCNC: 58 U/L — SIGNIFICANT CHANGE UP (ref 40–120)
ALT FLD-CCNC: 17 U/L DA — SIGNIFICANT CHANGE UP (ref 10–60)
ANION GAP SERPL CALC-SCNC: 4 MMOL/L — LOW (ref 5–17)
AST SERPL-CCNC: 12 U/L — SIGNIFICANT CHANGE UP (ref 10–40)
BILIRUB SERPL-MCNC: 0.5 MG/DL — SIGNIFICANT CHANGE UP (ref 0.2–1.2)
BUN SERPL-MCNC: 12 MG/DL — SIGNIFICANT CHANGE UP (ref 7–18)
CALCIUM SERPL-MCNC: 8.4 MG/DL — SIGNIFICANT CHANGE UP (ref 8.4–10.5)
CHLORIDE SERPL-SCNC: 110 MMOL/L — HIGH (ref 96–108)
CO2 SERPL-SCNC: 28 MMOL/L — SIGNIFICANT CHANGE UP (ref 22–31)
CREAT SERPL-MCNC: 0.81 MG/DL — SIGNIFICANT CHANGE UP (ref 0.5–1.3)
GLUCOSE BLDC GLUCOMTR-MCNC: 100 MG/DL — HIGH (ref 70–99)
GLUCOSE BLDC GLUCOMTR-MCNC: 97 MG/DL — SIGNIFICANT CHANGE UP (ref 70–99)
GLUCOSE SERPL-MCNC: 85 MG/DL — SIGNIFICANT CHANGE UP (ref 70–99)
HCT VFR BLD CALC: 34.6 % — SIGNIFICANT CHANGE UP (ref 34.5–45)
HGB BLD-MCNC: 10.7 G/DL — LOW (ref 11.5–15.5)
MCHC RBC-ENTMCNC: 20.4 PG — LOW (ref 27–34)
MCHC RBC-ENTMCNC: 30.9 GM/DL — LOW (ref 32–36)
MCV RBC AUTO: 66 FL — LOW (ref 80–100)
NRBC # BLD: 0 /100 WBCS — SIGNIFICANT CHANGE UP (ref 0–0)
PLATELET # BLD AUTO: 223 K/UL — SIGNIFICANT CHANGE UP (ref 150–400)
POTASSIUM SERPL-MCNC: 4 MMOL/L — SIGNIFICANT CHANGE UP (ref 3.5–5.3)
POTASSIUM SERPL-SCNC: 4 MMOL/L — SIGNIFICANT CHANGE UP (ref 3.5–5.3)
PROT SERPL-MCNC: 6.9 G/DL — SIGNIFICANT CHANGE UP (ref 6–8.3)
RBC # BLD: 5.24 M/UL — HIGH (ref 3.8–5.2)
RBC # FLD: 19 % — HIGH (ref 10.3–14.5)
SODIUM SERPL-SCNC: 142 MMOL/L — SIGNIFICANT CHANGE UP (ref 135–145)
WBC # BLD: 5.13 K/UL — SIGNIFICANT CHANGE UP (ref 3.8–10.5)
WBC # FLD AUTO: 5.13 K/UL — SIGNIFICANT CHANGE UP (ref 3.8–10.5)

## 2020-09-05 PROCEDURE — 99233 SBSQ HOSP IP/OBS HIGH 50: CPT

## 2020-09-05 PROCEDURE — 99232 SBSQ HOSP IP/OBS MODERATE 35: CPT | Mod: GC

## 2020-09-05 RX ORDER — SODIUM CHLORIDE 9 MG/ML
500 INJECTION INTRAMUSCULAR; INTRAVENOUS; SUBCUTANEOUS ONCE
Refills: 0 | Status: COMPLETED | OUTPATIENT
Start: 2020-09-05 | End: 2020-09-05

## 2020-09-05 RX ORDER — ACETAMINOPHEN 500 MG
650 TABLET ORAL ONCE
Refills: 0 | Status: COMPLETED | OUTPATIENT
Start: 2020-09-05 | End: 2020-09-05

## 2020-09-05 RX ORDER — MECLIZINE HCL 12.5 MG
12.5 TABLET ORAL ONCE
Refills: 0 | Status: COMPLETED | OUTPATIENT
Start: 2020-09-05 | End: 2020-09-05

## 2020-09-05 RX ORDER — SODIUM CHLORIDE 9 MG/ML
1000 INJECTION INTRAMUSCULAR; INTRAVENOUS; SUBCUTANEOUS
Refills: 0 | Status: COMPLETED | OUTPATIENT
Start: 2020-09-05 | End: 2020-09-06

## 2020-09-05 RX ADMIN — Medication 12.5 MILLIGRAM(S): at 06:18

## 2020-09-05 RX ADMIN — SENNA PLUS 2 TABLET(S): 8.6 TABLET ORAL at 23:04

## 2020-09-05 RX ADMIN — SODIUM CHLORIDE 1000 MILLILITER(S): 9 INJECTION INTRAMUSCULAR; INTRAVENOUS; SUBCUTANEOUS at 09:19

## 2020-09-05 RX ADMIN — APIXABAN 5 MILLIGRAM(S): 2.5 TABLET, FILM COATED ORAL at 06:18

## 2020-09-05 RX ADMIN — Medication 30 MILLIGRAM(S): at 12:06

## 2020-09-05 RX ADMIN — APIXABAN 5 MILLIGRAM(S): 2.5 TABLET, FILM COATED ORAL at 18:16

## 2020-09-05 RX ADMIN — Medication 650 MILLIGRAM(S): at 23:04

## 2020-09-05 RX ADMIN — Medication 30 MILLIGRAM(S): at 18:16

## 2020-09-05 RX ADMIN — Medication 30 MILLIGRAM(S): at 23:04

## 2020-09-05 RX ADMIN — SODIUM CHLORIDE 65 MILLILITER(S): 9 INJECTION INTRAMUSCULAR; INTRAVENOUS; SUBCUTANEOUS at 08:43

## 2020-09-05 NOTE — PROGRESS NOTE ADULT - ATTENDING COMMENTS
I counseled the patient about the conservative steps to take to help prevent syncopal events in the setting of orthostatic hypotension, and the importance of routine follow-up to manage symptoms.
Patient was discussed with Dr. Zapata and examined at the bedside.     She is feeling less dizzy.   Orthostatic hypotension persists.   s/p COVID pneumonia with CXR changes.  Hypoxia on RA with exertion persists.  She will continue to require home oxygen.     Plan: Possible discharge in AM.  She has oxygen at home.  Discussed with Case Management.
Patient was examined in the ED and discussed with NP.     She is still feeling dizzy.  Orthostatics have improved after fluids.   Vital Signs Last 24 Hrs  T(C): 36.4 (04 Sep 2020 16:11), Max: 36.6 (04 Sep 2020 11:12)  T(F): 97.6 (04 Sep 2020 16:11), Max: 97.8 (04 Sep 2020 11:12)  HR: 73 (04 Sep 2020 18:04) (62 - 77)  BP: 152/75 (04 Sep 2020 18:04) (119/58 - 152/75)  BP(mean): --  RR: 20 (04 Sep 2020 18:27) (16 - 20)  SpO2: 89% (04 Sep 2020 18:27) (89% - 100%)  Lungs, bilateral creps  Cor, RRR  Abdomen, soft                        10.6   5.19  )-----------( 219      ( 04 Sep 2020 06:58 )             34.5   09-04    142  |  112<H>  |  12  ----------------------------<  83  4.2   |  27  |  0.80    Ca    7.5<L>      04 Sep 2020 06:58  Phos  2.5     09-04  Mg     2.6     09-04    TPro  6.6  /  Alb  2.9<L>  /  TBili  0.5  /  DBili  x   /  AST  13  /  ALT  14  /  AlkPhos  56  09-04    PT consultation, seen and appreciated.     IMP:  s/p acute and chronic hypoxic failure secondary to COVID pneumonia with persistent hypoxia          Dizziness, improving          Possible meningioma in parafalcine area, likely not related to acute process  Plan:  Continue telemetry.  OOB to chair.  Continue nasal oxygen.  Case management consultation.

## 2020-09-05 NOTE — PROGRESS NOTE ADULT - SUBJECTIVE AND OBJECTIVE BOX
PGY3 Note discussed with primary attending.    Patient is a 70y old  Female who presents with a chief complaint of Dizziness (04 Sep 2020 16:10)      INTERVAL HPI/OVERNIGHT EVENTS: No acute events overnight. Reports dizziness when turning head. Otherwise no new complaints.    MEDICATIONS  (STANDING):  apixaban 5 milliGRAM(s) Oral every 12 hours  diltiazem    Tablet 30 milliGRAM(s) Oral every 6 hours  senna 2 Tablet(s) Oral at bedtime  sodium chloride 0.9%. 1000 milliLiter(s) (65 mL/Hr) IV Continuous <Continuous>    MEDICATIONS  (PRN):      Allergies    aspirin (Nausea; Vomiting)  aspirin (Unknown)  penicillin (Unknown)  shellfish (Anaphylaxis; Angioedema)    Intolerances        REVIEW OF SYSTEMS:  CONSTITUTIONAL: No fever, weight loss, or fatigue  RESPIRATORY: +chronic cough (reports that this has been a persistent issue since she had COVID-19)  CARDIOVASCULAR: No chest pain, palpitations, dizziness, or leg swelling  GASTROINTESTINAL: No abdominal or epigastric pain. No nausea, vomiting, or hematemesis; No diarrhea or constipation. No melena or hematochezia.  NEUROLOGICAL: +dizziness  SKIN: No itching, burning, rashes, or lesions     Vital Signs Last 24 Hrs  T(C): 36.1 (05 Sep 2020 05:13), Max: 36.8 (04 Sep 2020 21:05)  T(F): 97 (05 Sep 2020 05:13), Max: 98.2 (04 Sep 2020 21:05)  HR: 64 (05 Sep 2020 05:13) (60 - 77)  BP: 109/61 (05 Sep 2020 05:13) (106/65 - 152/75)  BP(mean): --  RR: 18 (05 Sep 2020 05:13) (16 - 20)  SpO2: 100% (05 Sep 2020 05:13) (89% - 100%)    PHYSICAL EXAM:  GENERAL: NAD, sitting up in bed eating breakfast  HEAD:  Atraumatic, Normocephalic  EYES: EOMI, PERRLA, conjunctiva and sclera clear  NECK: Supple, No JVD, Normal thyroid  CHEST/LUNG: Clear to percussion bilaterally; No rales, rhonchi, wheezing, or rubs  HEART: Regular rate and rhythm; No murmurs, rubs, or gallops  ABDOMEN: Soft, Nontender, Nondistended; Bowel sounds present  NERVOUS SYSTEM:  Alert & Oriented X3, Good concentration  EXTREMITIES:  2+ Peripheral Pulses, No clubbing, cyanosis, or edema    LABS:                        10.7   5.13  )-----------( 223      ( 05 Sep 2020 05:44 )             34.6     09-05    142  |  110<H>  |  12  ----------------------------<  85  4.0   |  28  |  0.81    Ca    8.4      05 Sep 2020 05:44  Phos  2.5     09-04  Mg     2.6     09-04    TPro  6.9  /  Alb  3.2<L>  /  TBili  0.5  /  DBili  x   /  AST  12  /  ALT  17  /  AlkPhos  58  09-05    PT/INR - ( 03 Sep 2020 10:49 )   PT: 13.2 sec;   INR: 1.14 ratio         PTT - ( 03 Sep 2020 10:49 )  PTT:42.1 sec    CAPILLARY BLOOD GLUCOSE      RADIOLOGY & ADDITIONAL TESTS:    Imaging Personally Reviewed:  [X ] YES  [ ] NO    Consultant(s) Notes Reviewed:  [X ] YES  [ ] NO

## 2020-09-05 NOTE — PROGRESS NOTE ADULT - PROBLEM SELECTOR PLAN 3
CT head identifies 1 x 0.2 x 0.7 cm right posterior parafalcine lesion, possibly a meningioma  no acute intervention warranted  outpatient surveillance

## 2020-09-05 NOTE — PROGRESS NOTE ADULT - SUBJECTIVE AND OBJECTIVE BOX
Patient reports improvement in her dizzy episodes since presentation.    Normal neuro exam: No nystagmus, Negative [place]-Hallpike maneuver b/l.    CT Head is only notable for incidental right posterior meningioma.  Carotid Doppler shows no significant stenosis        Dx: Syncope secondary to orthostatic hypotension    Recs:    Syncope Workup and Management  - Encourage plentiful fluid hydration  - Caution with rapid changes in position  - Continue apixaban and diltiazem for management of atrial fibrillation  - If symptoms recur and orthostatic hypotension is re-demonstrated, may consider midodrine 2.5mg PO TID  - Routine follow-up with PCP and Cardiology, and as needed with Neurology        NOTE TO BE COMPLETED - PLEASE REFER TO ABOVE ONLY AND IGNORE INFORMATION BELOW    Neurology Follow up note    Name  JOSEPH CARLOS    HPI:  70 year old female with medical history of HTN, Afib, HLD, COVID s/p intubation and PEG on home O2 (2L)  presented to ED for complaining of Dizziness. Patient states that she  was feeling very dizzy ( room spinning) since this morning associated with 3 episodes of vomiting , Vomitus contained water because off nausea she is did not ate any thing since this morning. She feels like she will fall and will loose balance. Patient states that she was diagnosed iwth Covid in march was admitted to Hospital was intubated had prolonged hospital course was intubated , Peg tube was placed later Patient was extubated and was discharged to Rehab. Patient was discharged from Rehab in July 2020. PEG tube was removed . Patient is able to tolerate PO diet for 3 weeks but states she still feels weak. Patient denies any chest pain, palpitations , head ache, fever, chills , or urinary problems (03 Sep 2020 18:05)      Interval History -        Subjective:        MEDICATIONS  (STANDING):  apixaban 5 milliGRAM(s) Oral every 12 hours  diltiazem    Tablet 30 milliGRAM(s) Oral every 6 hours  senna 2 Tablet(s) Oral at bedtime  sodium chloride 0.9%. 1000 milliLiter(s) (65 mL/Hr) IV Continuous <Continuous>  sodium chloride 0.9%. 1000 milliLiter(s) (65 mL/Hr) IV Continuous <Continuous>    MEDICATIONS  (PRN):      Allergies    aspirin (Nausea; Vomiting)  aspirin (Unknown)  penicillin (Unknown)  shellfish (Anaphylaxis; Angioedema)    Intolerances        Review of Systems:  General: [ ] None, [ ] chills, [ ]fatigue, [ ] fevers  Skin: [ ] None, [ ] rash   HEENT: [ ] None, [ ] head injury, [ ] blurred vision, [ ] double vision, [ ] eye pain, [ ] visual loss, [ ] hearing loss, [ ] deafness, [ ] ear pain, [ ] ringing in the ears, [ ] vertigo, [ ] sinus pain, [ ] voice changes  Neck: [ ] None, [ ] neck stiffness  Respiratory: [ ] None, [ ] cough, [ ] difficulty breathing  Cardiovascular: [ ] None, [ ] calf cramps, [ ] chest pain, [ ] leg pain, [ ] swelling, [ ] rapid heart rate, [ ] shortness of breath  Gastrointestinal: [ ] None, [ ] abdominal pain, [ ] nausea, [ ] vomiting  Musculoskeletal: [ ] None, [ ] back pain, [ ] joint pain, [ ] joint stiffness, [ ] leg cramps, [ ] muscle atrophy, [ ] muscle cramps, [ ] muscle weakness, [ ] swelling of extremities  Neurological: [ ] None, [ ] Dizziness, [ ] decreased memory, [ ] fainting, [ ] focal neurological symptoms, [ ] headaches, [ ] incontinence of stool, [ ] incontinence of urine, [ ] loss of consciousness, [ ] numbness, [ ] seizures, [ ] spinning sensation, [ ] stroke, [ ] trouble walking, [ ] unsteadiness, [ ] visual changes, [ ] weakness  Psychiatric: [ ] None,  [ ] depression, [ ] anxiety, [ ] hallucinations, [ ] inability to concentrate, [ ] mood changes, [ ] panic attacks  Hematology: [ ] None,  [ ] blood clots, [ ] spontaneous bleeding      Objective:   Vital Signs Last 24 Hrs  T(C): 37.1 (05 Sep 2020 20:57), Max: 37.1 (05 Sep 2020 20:57)  T(F): 98.7 (05 Sep 2020 20:57), Max: 98.7 (05 Sep 2020 20:57)  HR: 68 (05 Sep 2020 20:57) (60 - 80)  BP: 123/53 (05 Sep 2020 20:57) (106/65 - 130/72)  BP(mean): --  RR: 18 (05 Sep 2020 20:57) (18 - 18)  SpO2: 100% (05 Sep 2020 20:57) (98% - 100%)    General Exam:   General appearance: No acute distress                 Cardiovascular: Pedal dorsalis pulses intact bilaterally    Neurological Exam:  Mental Status: Orientated to self, date and place.  Attention intact.  No dysarthria, aphasia or neglect.  Knowledge intact.  Registration intact.  Short and long term memory grossly intact.      Cranial Nerves: CN I - not tested.  PERRL, EOMI, VFF, no nystagmus or diplopia.  No APD.  Fundi not visualized bilaterally.  CN V1-3 intact to light touch and pinprick.  No facial asymmetry.  Hearing intact to finger rub bilaterally.  Tongue, uvula and palate midline.  Sternocleidomastoid and Trapezius intact bilaterally.    Motor:   Tone: normal.                  Strength: intact throughout  Pronator drift: none                 Dysmeria: None to finger-nose-finger or heel-shin-heel  No truncal ataxia.    Tremor: No resting, postural or action tremor.  No myoclonus.    Sensation: intact to light touch, pinprick, vibration and proprioception    Deep Tendon Reflexes: 1+ bilateral biceps, triceps, brachioradialis, knee and ankle  Toes flexor bilaterally    Gait: normal and stable.        Labs:    09-05    142  |  110<H>  |  12  ----------------------------<  85  4.0   |  28  |  0.81    Ca    8.4      05 Sep 2020 05:44  Phos  2.5     09-04  Mg     2.6     09-04    TPro  6.9  /  Alb  3.2<L>  /  TBili  0.5  /  DBili  x   /  AST  12  /  ALT  17  /  AlkPhos  58  09-05          Radiology    EKG:  tele:  TTE:  EEG:                 Please contact the Neurology consult service with any questions.    Raul Phan MD   of Neurology  [facility] at [facility] Neurology Follow up note    Name  JOSEPH CARLOS    HPI:  70 year old female with medical history of HTN, Afib, HLD, COVID s/p intubation and PEG on home O2 (2L)  presented to ED for complaining of Dizziness. Patient states that she  was feeling very dizzy ( room spinning) since this morning associated with 3 episodes of vomiting , Vomitus contained water because off nausea she is did not ate any thing since this morning. She feels like she will fall and will loose balance. Patient states that she was diagnosed with COVID-19 in March 2020 was admitted to Hospital was intubated had prolonged hospital course was intubated, PEG tube was placed later Patient was extubated and was discharged to Rehab. Patient was discharged from Rehab in July 2020. PEG tube was removed. Patient is able to tolerate PO diet for 3 weeks but states she still feels weak. Patient denies any chest pain, palpitations , head ache, fever, chills , or urinary problems (03 Sep 2020 18:05)    NEURO HPI:  Pt reports when she woke up and stood up to walk she had dizziness-spinning sensation x 10 mins.  Spinning was associated w/ nausea and vomiting, however, denies HA at the time.       Interval History -  The patient reports improvement in her dizzy episodes since presentation.    MEDICATIONS  (STANDING):  apixaban 5 milliGRAM(s) Oral every 12 hours  diltiazem    Tablet 30 milliGRAM(s) Oral every 6 hours  senna 2 Tablet(s) Oral at bedtime  sodium chloride 0.9%. 1000 milliLiter(s) (65 mL/Hr) IV Continuous <Continuous>  sodium chloride 0.9%. 1000 milliLiter(s) (65 mL/Hr) IV Continuous <Continuous>    Allergies  aspirin (Nausea; Vomiting)  penicillin (Unknown)  shellfish (Anaphylaxis; Angioedema)    Review of Systems: Fourteen systems reviewed and negative except as in HPI / Interval History.      Objective:   Vital Signs Last 24 Hrs  T(C): 37.1 (05 Sep 2020 20:57), Max: 37.1 (05 Sep 2020 20:57)  T(F): 98.7 (05 Sep 2020 20:57), Max: 98.7 (05 Sep 2020 20:57)  HR: 68 (05 Sep 2020 20:57) (60 - 80)  BP: 123/53 (05 Sep 2020 20:57) (106/65 - 130/72)  RR: 18 (05 Sep 2020 20:57) (18 - 18)  SpO2: 100% (05 Sep 2020 20:57) (98% - 100%)    General Exam:  General: No acute distress  Respiratory: CTAB/l.  No crackles, rhonchi, or wheezes.  Cardiovascular: RRR, No murmurs, Full b/l radial and pedal pulses    Neurological Exam:  General / Mental Status: Oriented to person, place, and time.  No dysarthria or aphasia present.  Naming and repetition intact.  Cranial Nerves: PERRLA, EOMI x 2, VFF x 4, No nystagmus or diplopia.  B/l V1-V3 equal to light touch and pinprick.  Symmetric facial movement and palate elevation.  B/l hearing equal to finger rub.  Negative [place]-Hallpike maneuver b/l.  5/5 strength with b/l sternocleidomastoid and trapezius.  Midline tongue protrusion with no atrophy or fasciculations.  Motor: Normal bulk and tone in all four extremities.  5/5 strength throughout all four extremities.  No downward drift, rigidity, spasticity, or tremors in any of the four extremities.  Sensation: Intact to light touch and pinprick in all four extremities.  Coordination: No dysmetria with b/l finger-to-nose and heel raise tests.  Normal rapid alternating movements b/l.  Reflexes: 1+ and symmetric at b/l biceps, triceps, brachioradialis, patellae, and ankles.  Toes flexor b/l.  Gait and Romberg testing deferred per patient request.      Labs:    09-05    142  |  110<H>  |  12  ----------------------------<  85  4.0   |  28  |  0.81    Ca    8.4      05 Sep 2020 05:44  Phos  2.5     09-04  Mg     2.6     09-04    TPro  6.9  /  Alb  3.2<L>  /  TBili  0.5  /  DBili  x   /  AST  12  /  ALT  17  /  AlkPhos  58  09-05    A1C with Estimated Average Glucose (09.04.20 @ 11:00)    A1C with Estimated Average Glucose Result: 5.8%    Estimated Average Glucose: 120 mg/dL    Lipid Profile (09.04.20 @ 06:58)    Total Cholesterol/HDL Ratio Measurement: 4.1 RATIO    Cholesterol, Serum: 199 mg/dL    Triglycerides, Serum: 124 mg/dL    HDL Cholesterol, Serum: 48 mg/dL    Direct LDL: 126 mg/dL      Neuroimaging:    CT Head (9/3/20):  - No acute intracranial abnormality  - Right posterior parafalcine meningioma (1.0 x 0.2 x 0.7 cm)  - Partially empty sella    Carotid Doppler (9/3/20):  - No hemodynamically significant stenosis in b/l carotid arteries      Assessment:  70 RHF with syncopal event secondary to orthostatic hypotension      Recommendations:    - Encourage plentiful fluid hydration    - Caution with rapid changes in position    - Continue apixaban and diltiazem for management of atrial fibrillation    - If symptoms recur and orthostatic hypotension is re-demonstrated, may consider midodrine 2.5mg PO TID    - Routine follow-up with PCP and Cardiology, and as needed with Neurology        Please contact the Neurology consult service with any questions.    Raul Phan MD   of Neurology  [facility] at [facility]

## 2020-09-05 NOTE — PROGRESS NOTE ADULT - PROBLEM SELECTOR PLAN 1
CT head shows no acute pathology; identifies an incidental meningioma  carotid doppler negative for stenosis  orthostatic hypotension noted - likely source of dizziness  tele monitoring  f/u echo  recheck orthostatics today - if persistently hypotensive, will resume IV fluids CT head shows no acute pathology; identifies an incidental meningioma  carotid doppler negative for stenosis  orthostatic hypotension noted - likely source of dizziness  tele monitoring  f/u echo  orthostatic hypotension again noted this morning - giving 500cc NS bolus and resuming maintenance IVF for 24 more hours CT head shows no acute pathology; identifies an incidental meningioma  carotid doppler negative for stenosis  orthostatic hypotension noted - likely source of dizziness  tele monitoring  f/u echo  orthostatic hypotension again noted this morning - giving 500cc NS bolus and resuming maintenance IVF for 24 more hours  discharge planning for tomorrow  case management notified of patient's home PT needs

## 2020-09-05 NOTE — PROGRESS NOTE ADULT - ASSESSMENT
This is a 71 y/o female admitted to telemetry for complaint of dizziness. Patient found to have orthostatic hypotension, which may explain her symptoms. Will recheck orthostatics today to assess for resolution s/p IV fluids.

## 2020-09-06 ENCOUNTER — TRANSCRIPTION ENCOUNTER (OUTPATIENT)
Age: 70
End: 2020-09-06

## 2020-09-06 VITALS
RESPIRATION RATE: 16 BRPM | HEART RATE: 101 BPM | SYSTOLIC BLOOD PRESSURE: 140 MMHG | OXYGEN SATURATION: 95 % | DIASTOLIC BLOOD PRESSURE: 77 MMHG

## 2020-09-06 LAB
ALBUMIN SERPL ELPH-MCNC: 3.2 G/DL — LOW (ref 3.5–5)
ALP SERPL-CCNC: 58 U/L — SIGNIFICANT CHANGE UP (ref 40–120)
ALT FLD-CCNC: 15 U/L DA — SIGNIFICANT CHANGE UP (ref 10–60)
ANION GAP SERPL CALC-SCNC: 4 MMOL/L — LOW (ref 5–17)
AST SERPL-CCNC: 12 U/L — SIGNIFICANT CHANGE UP (ref 10–40)
BILIRUB SERPL-MCNC: 0.5 MG/DL — SIGNIFICANT CHANGE UP (ref 0.2–1.2)
BUN SERPL-MCNC: 13 MG/DL — SIGNIFICANT CHANGE UP (ref 7–18)
CALCIUM SERPL-MCNC: 8.4 MG/DL — SIGNIFICANT CHANGE UP (ref 8.4–10.5)
CHLORIDE SERPL-SCNC: 107 MMOL/L — SIGNIFICANT CHANGE UP (ref 96–108)
CO2 SERPL-SCNC: 28 MMOL/L — SIGNIFICANT CHANGE UP (ref 22–31)
CREAT SERPL-MCNC: 0.79 MG/DL — SIGNIFICANT CHANGE UP (ref 0.5–1.3)
GLUCOSE SERPL-MCNC: 87 MG/DL — SIGNIFICANT CHANGE UP (ref 70–99)
HCT VFR BLD CALC: 36 % — SIGNIFICANT CHANGE UP (ref 34.5–45)
HGB BLD-MCNC: 10.9 G/DL — LOW (ref 11.5–15.5)
MCHC RBC-ENTMCNC: 20 PG — LOW (ref 27–34)
MCHC RBC-ENTMCNC: 30.3 GM/DL — LOW (ref 32–36)
MCV RBC AUTO: 66.2 FL — LOW (ref 80–100)
NRBC # BLD: 0 /100 WBCS — SIGNIFICANT CHANGE UP (ref 0–0)
PLATELET # BLD AUTO: 222 K/UL — SIGNIFICANT CHANGE UP (ref 150–400)
POTASSIUM SERPL-MCNC: 3.9 MMOL/L — SIGNIFICANT CHANGE UP (ref 3.5–5.3)
POTASSIUM SERPL-SCNC: 3.9 MMOL/L — SIGNIFICANT CHANGE UP (ref 3.5–5.3)
PROT SERPL-MCNC: 6.9 G/DL — SIGNIFICANT CHANGE UP (ref 6–8.3)
RBC # BLD: 5.44 M/UL — HIGH (ref 3.8–5.2)
RBC # FLD: 18.4 % — HIGH (ref 10.3–14.5)
SODIUM SERPL-SCNC: 139 MMOL/L — SIGNIFICANT CHANGE UP (ref 135–145)
WBC # BLD: 5.37 K/UL — SIGNIFICANT CHANGE UP (ref 3.8–10.5)
WBC # FLD AUTO: 5.37 K/UL — SIGNIFICANT CHANGE UP (ref 3.8–10.5)

## 2020-09-06 PROCEDURE — 86803 HEPATITIS C AB TEST: CPT

## 2020-09-06 PROCEDURE — 82728 ASSAY OF FERRITIN: CPT

## 2020-09-06 PROCEDURE — 83880 ASSAY OF NATRIURETIC PEPTIDE: CPT

## 2020-09-06 PROCEDURE — 82550 ASSAY OF CK (CPK): CPT

## 2020-09-06 PROCEDURE — U0003: CPT

## 2020-09-06 PROCEDURE — 93880 EXTRACRANIAL BILAT STUDY: CPT

## 2020-09-06 PROCEDURE — 84100 ASSAY OF PHOSPHORUS: CPT

## 2020-09-06 PROCEDURE — 82607 VITAMIN B-12: CPT

## 2020-09-06 PROCEDURE — 80061 LIPID PANEL: CPT

## 2020-09-06 PROCEDURE — 97162 PT EVAL MOD COMPLEX 30 MIN: CPT

## 2020-09-06 PROCEDURE — 84484 ASSAY OF TROPONIN QUANT: CPT

## 2020-09-06 PROCEDURE — 82962 GLUCOSE BLOOD TEST: CPT

## 2020-09-06 PROCEDURE — 36415 COLL VENOUS BLD VENIPUNCTURE: CPT

## 2020-09-06 PROCEDURE — 85610 PROTHROMBIN TIME: CPT

## 2020-09-06 PROCEDURE — 84443 ASSAY THYROID STIM HORMONE: CPT

## 2020-09-06 PROCEDURE — 85027 COMPLETE CBC AUTOMATED: CPT

## 2020-09-06 PROCEDURE — 93005 ELECTROCARDIOGRAM TRACING: CPT

## 2020-09-06 PROCEDURE — 99238 HOSP IP/OBS DSCHRG MGMT 30/<: CPT | Mod: GC

## 2020-09-06 PROCEDURE — 85730 THROMBOPLASTIN TIME PARTIAL: CPT

## 2020-09-06 PROCEDURE — 83735 ASSAY OF MAGNESIUM: CPT

## 2020-09-06 PROCEDURE — 99285 EMERGENCY DEPT VISIT HI MDM: CPT | Mod: 25

## 2020-09-06 PROCEDURE — 85379 FIBRIN DEGRADATION QUANT: CPT

## 2020-09-06 PROCEDURE — 70450 CT HEAD/BRAIN W/O DYE: CPT

## 2020-09-06 PROCEDURE — 86769 SARS-COV-2 COVID-19 ANTIBODY: CPT

## 2020-09-06 PROCEDURE — 86850 RBC ANTIBODY SCREEN: CPT

## 2020-09-06 PROCEDURE — 80053 COMPREHEN METABOLIC PANEL: CPT

## 2020-09-06 PROCEDURE — 86901 BLOOD TYPING SEROLOGIC RH(D): CPT

## 2020-09-06 PROCEDURE — 82553 CREATINE MB FRACTION: CPT

## 2020-09-06 PROCEDURE — 71045 X-RAY EXAM CHEST 1 VIEW: CPT

## 2020-09-06 PROCEDURE — 83036 HEMOGLOBIN GLYCOSYLATED A1C: CPT

## 2020-09-06 PROCEDURE — 83615 LACTATE (LD) (LDH) ENZYME: CPT

## 2020-09-06 PROCEDURE — 86900 BLOOD TYPING SEROLOGIC ABO: CPT

## 2020-09-06 PROCEDURE — 82746 ASSAY OF FOLIC ACID SERUM: CPT

## 2020-09-06 PROCEDURE — 93306 TTE W/DOPPLER COMPLETE: CPT

## 2020-09-06 RX ORDER — SENNA PLUS 8.6 MG/1
2 TABLET ORAL
Qty: 0 | Refills: 0 | DISCHARGE
Start: 2020-09-06

## 2020-09-06 RX ORDER — DILTIAZEM HCL 120 MG
1 CAPSULE, EXT RELEASE 24 HR ORAL
Qty: 120 | Refills: 0
Start: 2020-09-06 | End: 2020-10-05

## 2020-09-06 RX ORDER — DILTIAZEM HCL 120 MG
1 CAPSULE, EXT RELEASE 24 HR ORAL
Qty: 0 | Refills: 0 | DISCHARGE

## 2020-09-06 RX ORDER — DILTIAZEM HCL 120 MG
1 CAPSULE, EXT RELEASE 24 HR ORAL
Qty: 0 | Refills: 0 | DISCHARGE
Start: 2020-09-06

## 2020-09-06 RX ORDER — LOSARTAN POTASSIUM 100 MG/1
1 TABLET, FILM COATED ORAL
Qty: 0 | Refills: 0 | DISCHARGE

## 2020-09-06 RX ORDER — SENNA PLUS 8.6 MG/1
2 TABLET ORAL
Qty: 60 | Refills: 0
Start: 2020-09-06 | End: 2020-10-05

## 2020-09-06 RX ADMIN — SODIUM CHLORIDE 65 MILLILITER(S): 9 INJECTION INTRAMUSCULAR; INTRAVENOUS; SUBCUTANEOUS at 12:37

## 2020-09-06 RX ADMIN — APIXABAN 5 MILLIGRAM(S): 2.5 TABLET, FILM COATED ORAL at 05:54

## 2020-09-06 RX ADMIN — Medication 30 MILLIGRAM(S): at 05:54

## 2020-09-06 RX ADMIN — Medication 30 MILLIGRAM(S): at 17:25

## 2020-09-06 RX ADMIN — APIXABAN 5 MILLIGRAM(S): 2.5 TABLET, FILM COATED ORAL at 17:25

## 2020-09-06 RX ADMIN — Medication 650 MILLIGRAM(S): at 00:00

## 2020-09-06 NOTE — DISCHARGE NOTE NURSING/CASE MANAGEMENT/SOCIAL WORK - PATIENT PORTAL LINK FT
You can access the FollowMyHealth Patient Portal offered by [facility] by registering at the following website: [email or web address]. By joining [facility]’s FollowMyHealth portal, you will also be able to view your health information using other applications (apps) compatible with our system.

## 2020-09-08 PROBLEM — E78.5 HYPERLIPIDEMIA, UNSPECIFIED: Chronic | Status: ACTIVE | Noted: 2020-09-03

## 2020-09-08 PROBLEM — I10 ESSENTIAL (PRIMARY) HYPERTENSION: Chronic | Status: ACTIVE | Noted: 2020-09-03

## 2020-09-08 PROBLEM — I48.91 UNSPECIFIED ATRIAL FIBRILLATION: Chronic | Status: ACTIVE | Noted: 2020-09-03

## 2020-09-24 PROBLEM — Z00.00 ENCOUNTER FOR PREVENTIVE HEALTH EXAMINATION: Status: ACTIVE | Noted: 2020-09-24

## 2020-09-25 ENCOUNTER — APPOINTMENT (OUTPATIENT)
Dept: NEUROSURGERY | Facility: CLINIC | Age: 70
End: 2020-09-25
Payer: MEDICARE

## 2020-09-25 VITALS
HEART RATE: 64 BPM | SYSTOLIC BLOOD PRESSURE: 129 MMHG | HEIGHT: 60 IN | DIASTOLIC BLOOD PRESSURE: 65 MMHG | WEIGHT: 151 LBS | TEMPERATURE: 97.8 F | OXYGEN SATURATION: 94 % | RESPIRATION RATE: 17 BRPM | BODY MASS INDEX: 29.64 KG/M2

## 2020-09-25 DIAGNOSIS — Z86.39 PERSONAL HISTORY OF OTHER ENDOCRINE, NUTRITIONAL AND METABOLIC DISEASE: ICD-10-CM

## 2020-09-25 DIAGNOSIS — Z82.3 FAMILY HISTORY OF STROKE: ICD-10-CM

## 2020-09-25 DIAGNOSIS — Z86.79 PERSONAL HISTORY OF OTHER DISEASES OF THE CIRCULATORY SYSTEM: ICD-10-CM

## 2020-09-25 DIAGNOSIS — Z01.818 ENCOUNTER FOR OTHER PREPROCEDURAL EXAMINATION: ICD-10-CM

## 2020-09-25 DIAGNOSIS — U07.1 COVID-19: ICD-10-CM

## 2020-09-25 DIAGNOSIS — D32.0 BENIGN NEOPLASM OF CEREBRAL MENINGES: ICD-10-CM

## 2020-09-25 DIAGNOSIS — Z87.898 PERSONAL HISTORY OF OTHER SPECIFIED CONDITIONS: ICD-10-CM

## 2020-09-25 PROCEDURE — 99205 OFFICE O/P NEW HI 60 MIN: CPT

## 2020-09-25 RX ORDER — ALBUTEROL 90 MCG
90 AEROSOL (GRAM) INHALATION
Refills: 0 | Status: ACTIVE | COMMUNITY

## 2020-09-25 RX ORDER — CALCIUM CITRATE/VITAMIN D3 315MG-6.25
TABLET ORAL
Refills: 0 | Status: ACTIVE | COMMUNITY

## 2020-09-25 RX ORDER — DILTIAZEM HYDROCHLORIDE 300 MG/1
300 CAPSULE, EXTENDED RELEASE ORAL
Refills: 0 | Status: ACTIVE | COMMUNITY

## 2020-09-25 RX ORDER — ROSUVASTATIN CALCIUM 20 MG/1
20 TABLET, FILM COATED ORAL
Refills: 0 | Status: ACTIVE | COMMUNITY

## 2020-09-25 RX ORDER — LOSARTAN POTASSIUM 50 MG/1
50 TABLET, FILM COATED ORAL
Refills: 0 | Status: ACTIVE | COMMUNITY

## 2020-09-25 RX ORDER — ALENDRONATE SODIUM 70 MG/1
70 TABLET ORAL
Refills: 0 | Status: ACTIVE | COMMUNITY

## 2020-09-25 RX ORDER — CYCLOSPORINE 0.5 MG/ML
0.05 EMULSION OPHTHALMIC
Refills: 0 | Status: ACTIVE | COMMUNITY

## 2020-09-25 RX ORDER — RANITIDINE 150 MG/1
150 TABLET ORAL
Refills: 0 | Status: ACTIVE | COMMUNITY

## 2020-09-25 RX ORDER — APIXABAN 5 MG/1
5 TABLET, FILM COATED ORAL
Refills: 0 | Status: ACTIVE | COMMUNITY

## 2020-09-25 RX ORDER — ATENOLOL 50 MG/1
TABLET ORAL
Refills: 0 | Status: ACTIVE | COMMUNITY

## 2020-09-25 NOTE — PHYSICAL EXAM
[General Appearance - Alert] : alert [General Appearance - In No Acute Distress] : in no acute distress [General Appearance - Well Nourished] : well nourished [General Appearance - Well Developed] : well developed [General Appearance - Well-Appearing] : healthy appearing [Oriented To Time, Place, And Person] : oriented to person, place, and time [Impaired Insight] : insight and judgment were intact [Affect] : the affect was normal [Memory Recent] : recent memory was not impaired [Person] : oriented to person [Place] : oriented to place [Time] : oriented to time [Cranial Nerves Optic (II)] : visual acuity intact bilaterally,  pupils equal round and reactive to light [Cranial Nerves Oculomotor (III)] : extraocular motion intact [Cranial Nerves Trigeminal (V)] : facial sensation intact symmetrically [Cranial Nerves Facial (VII)] : face symmetrical [Cranial Nerves Vestibulocochlear (VIII)] : hearing was intact bilaterally [Cranial Nerves Glossopharyngeal (IX)] : tongue and palate midline [Cranial Nerves Accessory (XI - Cranial And Spinal)] : head turning and shoulder shrug symmetric [Cranial Nerves Hypoglossal (XII)] : there was no tongue deviation with protrusion [Motor Strength] : muscle strength was normal in all four extremities [Motor Handedness Right-Handed] : the patient is right hand dominant [Balance] : balance was intact [Sclera] : the sclera and conjunctiva were normal [PERRL With Normal Accommodation] : pupils were equal in size, round, reactive to light, with normal accommodation [Extraocular Movements] : extraocular movements were intact [Outer Ear] : the ears and nose were normal in appearance [Hearing Threshold Finger Rub Not [place]] : hearing was normal [Oropharynx] : the oropharynx was normal [Neck Appearance] : the appearance of the neck was normal [Respiration, Rhythm And Depth] : normal respiratory rhythm and effort [Exaggerated Use Of Accessory Muscles For Inspiration] : no accessory muscle use [Heart Rate And Rhythm] : heart rate was normal and rhythm regular [Abnormal Walk] : normal gait [Involuntary Movements] : no involuntary movements were seen [Motor Tone] : muscle strength and tone were normal [Skin Color & Pigmentation] : normal skin color and pigmentation [] : no rash

## 2020-10-07 NOTE — ASSESSMENT
[FreeTextEntry1] : IMPRESSION:\par 1. Meningioma 1 x 0.2 x 0.7 cm right posterior parafalcine lesion, - possibly a meningioma.   Dr. Adair reviewed imaging and is not convinced that his a meningioma.  No surgical intervention recommended.\par \par \par \par PLAN:\par 1. MRI brain w/wo contrast in 1 year\par 2. Office visit after MRI\par

## 2020-10-07 NOTE — DATA REVIEWED
[de-identified] : \par \par EXAM: CT BRAIN \par \par \par PROCEDURE DATE: 09/03/2020 \par \par \par \par INTERPRETATION: CLINICAL INFORMATION: dizziness, vomiting, recent covid. . . \par \par TECHNIQUE: Sequential axial images were obtained from the vertex to the skull base without intravenous contrast. Coronal and sagittal reformations were obtained. \par \par COMPARISON: None . \par \par FINDINGS: \par \par 1 x 0.2 x 0.7 cm right posterior parafalcine lesion, possibly a meningioma (2:39). There is no acute intracranial hemorrhage. The ventricles and sulci are normal in size for patient's age. \par \par There is no extraaxial fluid collection. Partially empty sella. \par \par There is no displaced calvarial fracture. Status post bilateral intraocular lens implants. The visualized portions of the paranasal sinuses are well aerated. The mastoid air cells are well aerated. \par \par \par IMPRESSION: \par 1. No acute intracranial hemorrhage. \par 2. 1 x 0.2 x 0.7 cm right posterior parafalcine lesion, possibly a meningioma. \par \par \par \par \par \par \par CHERELLE ALMODOVAR M.D., ATTENDING RADIOLOGIST \par This document has been electronically signed. Sep 3 2020 9:50AM

## 2020-10-07 NOTE — HISTORY OF PRESENT ILLNESS
[FreeTextEntry1] : "Meningioma" [de-identified] : Hospital Course: \par Discharge Date	06-Sep-2020 \par Admission Date	03-Sep-2020 16:00 \par Reason for Admission	Dizziness \par Hospital Course	 \par 70 year old female with medical history of HTN, Afib, HLD, COVID s/p intubation and PEG (which has since been removed in August),  on home O2 (2L)  who \par presented to ED with complaint of Dizziness, associated with vomiting. Pt is admitted for evaluation of dizziness. Carotid US not significant for \par stenosis. CTH negative for ICH. but right posterior parafalcine lesion, possibly a meningioma. Neurology consulted. Passed swallowing test. Advanced diet.\par \par She presents today for a consultation regarding cerebral meningioma.  She denies new symptoms of vision problems, speech impairment, weakness in extremities.  She has a history of vertigo.

## 2022-07-26 NOTE — PATIENT PROFILE ADULT - PASTORAL.
Lab called with critical result of Gram Neg Rods in  Anaerobic blood culture.    Dr. De Leon notified of critical lab result. PT currently covered with antibiotics. No new orders.      chaplaincy/clergy/

## 2022-09-20 NOTE — H&P ADULT. - BACK
----- Message from JOHANA Minor sent at 9/19/2022  7:56 PM CDT -----  Please let Shon know her labs are normal.    Thanks ~ MB   
Left a message for the patient stating provider note below. Clinic number given if needed.     
No deformity or limitation of movement

## 2024-02-05 ENCOUNTER — NON-APPOINTMENT (OUTPATIENT)
Age: 74
End: 2024-02-05

## 2025-07-02 NOTE — H&P ADULT. - AS BP NONINV METHOD
Spoke with patient to let her know per Precious, her chest xray shows improvement. I let her her know to please call if she has any new or worsening symptoms. Patient voiced understanding and agreed.   electronic